# Patient Record
Sex: MALE | Race: WHITE | NOT HISPANIC OR LATINO | Employment: PART TIME | URBAN - METROPOLITAN AREA
[De-identification: names, ages, dates, MRNs, and addresses within clinical notes are randomized per-mention and may not be internally consistent; named-entity substitution may affect disease eponyms.]

---

## 2023-03-20 DIAGNOSIS — Z00.00 ENCOUNTER FOR GENERAL ADULT MEDICAL EXAMINATION WITHOUT ABNORMAL FINDINGS: ICD-10-CM

## 2023-03-20 DIAGNOSIS — D68.51 FACTOR V LEIDEN CARRIER (MULTI): Primary | ICD-10-CM

## 2023-03-20 RX ORDER — WARFARIN 2 MG/1
TABLET ORAL
Qty: 30 TABLET | Refills: 11 | Status: SHIPPED | OUTPATIENT
Start: 2023-03-20

## 2023-03-20 RX ORDER — WARFARIN SODIUM 5 MG/1
TABLET ORAL
Qty: 30 TABLET | Refills: 11 | Status: SHIPPED | OUTPATIENT
Start: 2023-03-20 | End: 2024-03-14

## 2023-03-20 RX ORDER — WARFARIN SODIUM 5 MG/1
TABLET ORAL
Qty: 30 TABLET | Refills: 11 | OUTPATIENT
Start: 2023-03-20

## 2023-03-20 RX ORDER — WARFARIN 2 MG/1
TABLET ORAL
COMMUNITY
Start: 2015-06-10 | End: 2023-03-20 | Stop reason: SDUPTHER

## 2023-03-20 RX ORDER — WARFARIN SODIUM 5 MG/1
TABLET ORAL
COMMUNITY
End: 2023-03-20 | Stop reason: SDUPTHER

## 2023-04-13 DIAGNOSIS — D68.51 FACTOR V LEIDEN CARRIER (MULTI): ICD-10-CM

## 2023-04-13 RX ORDER — WARFARIN 2 MG/1
TABLET ORAL
Qty: 30 TABLET | Refills: 11 | OUTPATIENT
Start: 2023-04-13

## 2023-04-13 RX ORDER — WARFARIN SODIUM 5 MG/1
TABLET ORAL
Qty: 30 TABLET | Refills: 11 | OUTPATIENT
Start: 2023-04-13

## 2023-07-12 ENCOUNTER — OFFICE VISIT (OUTPATIENT)
Dept: PRIMARY CARE | Facility: CLINIC | Age: 73
End: 2023-07-12
Payer: MEDICARE

## 2023-07-12 VITALS
WEIGHT: 163 LBS | BODY MASS INDEX: 24.14 KG/M2 | RESPIRATION RATE: 18 BRPM | HEIGHT: 69 IN | SYSTOLIC BLOOD PRESSURE: 128 MMHG | DIASTOLIC BLOOD PRESSURE: 75 MMHG | TEMPERATURE: 97.3 F | HEART RATE: 66 BPM | OXYGEN SATURATION: 97 %

## 2023-07-12 DIAGNOSIS — Z00.00 ROUTINE GENERAL MEDICAL EXAMINATION AT HEALTH CARE FACILITY: Primary | ICD-10-CM

## 2023-07-12 DIAGNOSIS — E78.5 HYPERLIPIDEMIA, UNSPECIFIED HYPERLIPIDEMIA TYPE: ICD-10-CM

## 2023-07-12 DIAGNOSIS — Z87.891 PERSONAL HISTORY OF TOBACCO USE, PRESENTING HAZARDS TO HEALTH: ICD-10-CM

## 2023-07-12 DIAGNOSIS — Z23 NEED FOR VACCINATION: ICD-10-CM

## 2023-07-12 DIAGNOSIS — Z12.11 SCREENING FOR COLORECTAL CANCER: ICD-10-CM

## 2023-07-12 DIAGNOSIS — I25.10 CAD IN NATIVE ARTERY: ICD-10-CM

## 2023-07-12 DIAGNOSIS — Z11.59 NEED FOR HEPATITIS C SCREENING TEST: ICD-10-CM

## 2023-07-12 DIAGNOSIS — Z12.12 SCREENING FOR COLORECTAL CANCER: ICD-10-CM

## 2023-07-12 PROBLEM — R91.8 OPACITY OF LUNG ON IMAGING STUDY: Status: RESOLVED | Noted: 2023-07-12 | Resolved: 2023-07-12

## 2023-07-12 PROBLEM — M25.511 RIGHT SHOULDER PAIN: Status: ACTIVE | Noted: 2023-07-12

## 2023-07-12 PROBLEM — S20.219A RIB CONTUSION: Status: RESOLVED | Noted: 2023-07-12 | Resolved: 2023-07-12

## 2023-07-12 PROBLEM — R22.2 MASS OF SKIN OF ABDOMEN: Status: RESOLVED | Noted: 2023-07-12 | Resolved: 2023-07-12

## 2023-07-12 PROBLEM — M79.673 FOOT PAIN: Status: RESOLVED | Noted: 2023-07-12 | Resolved: 2023-07-12

## 2023-07-12 PROBLEM — F17.210 SMOKES WITH GREATER THAN 40 PACK YEAR HISTORY: Status: ACTIVE | Noted: 2023-07-12

## 2023-07-12 PROBLEM — R06.2 WHEEZING: Status: ACTIVE | Noted: 2023-07-12

## 2023-07-12 PROBLEM — B35.1 ONYCHOMYCOSIS OF TOENAIL: Status: ACTIVE | Noted: 2023-07-12

## 2023-07-12 PROBLEM — D17.9 LIPOMA: Status: RESOLVED | Noted: 2023-07-12 | Resolved: 2023-07-12

## 2023-07-12 PROBLEM — M25.511 RIGHT SHOULDER PAIN: Status: RESOLVED | Noted: 2023-07-12 | Resolved: 2023-07-12

## 2023-07-12 PROBLEM — H66.90 OTITIS MEDIA: Status: RESOLVED | Noted: 2023-07-12 | Resolved: 2023-07-12

## 2023-07-12 PROBLEM — R10.9 ABDOMINAL PAIN: Status: RESOLVED | Noted: 2023-07-12 | Resolved: 2023-07-12

## 2023-07-12 PROBLEM — R05.9 COUGH: Status: RESOLVED | Noted: 2023-07-12 | Resolved: 2023-07-12

## 2023-07-12 PROBLEM — R06.2 WHEEZING: Status: RESOLVED | Noted: 2023-07-12 | Resolved: 2023-07-12

## 2023-07-12 PROBLEM — D22.9 MULTIPLE NEVI: Status: ACTIVE | Noted: 2023-07-12

## 2023-07-12 PROBLEM — M79.673 FOOT PAIN: Status: ACTIVE | Noted: 2023-07-12

## 2023-07-12 PROBLEM — R05.8 PRODUCTIVE COUGH: Status: RESOLVED | Noted: 2023-07-12 | Resolved: 2023-07-12

## 2023-07-12 PROBLEM — R91.8 LUNG NODULES: Status: ACTIVE | Noted: 2023-07-12

## 2023-07-12 PROBLEM — R91.8 OPACITY OF LUNG ON IMAGING STUDY: Status: ACTIVE | Noted: 2023-07-12

## 2023-07-12 PROBLEM — K04.7 TOOTH INFECTION: Status: RESOLVED | Noted: 2023-07-12 | Resolved: 2023-07-12

## 2023-07-12 PROBLEM — D17.21 LIPOMA OF RIGHT UPPER EXTREMITY: Status: ACTIVE | Noted: 2023-07-12

## 2023-07-12 PROBLEM — R10.9 ABDOMINAL PAIN: Status: ACTIVE | Noted: 2023-07-12

## 2023-07-12 PROBLEM — D17.9 LIPOMA: Status: ACTIVE | Noted: 2023-07-12

## 2023-07-12 PROBLEM — D22.9 MULTIPLE NEVI: Status: RESOLVED | Noted: 2023-07-12 | Resolved: 2023-07-12

## 2023-07-12 PROBLEM — R79.81 ABNORMAL PULSE OXIMETRY: Status: RESOLVED | Noted: 2023-07-12 | Resolved: 2023-07-12

## 2023-07-12 PROBLEM — B35.1 ONYCHOMYCOSIS OF TOENAIL: Status: RESOLVED | Noted: 2023-07-12 | Resolved: 2023-07-12

## 2023-07-12 PROBLEM — R79.89 ABNORMAL CBC: Status: RESOLVED | Noted: 2023-07-12 | Resolved: 2023-07-12

## 2023-07-12 PROBLEM — U07.1 DISEASE DUE TO SEVERE ACUTE RESPIRATORY SYNDROME CORONAVIRUS 2 (SARS-COV-2): Status: RESOLVED | Noted: 2023-07-12 | Resolved: 2023-07-12

## 2023-07-12 PROBLEM — D68.51 FACTOR V LEIDEN CARRIER (MULTI): Status: ACTIVE | Noted: 2023-07-12

## 2023-07-12 PROBLEM — S20.219A RIB CONTUSION: Status: ACTIVE | Noted: 2023-07-12

## 2023-07-12 PROBLEM — I70.90 ATHEROSCLEROSIS OF ARTERIES: Status: ACTIVE | Noted: 2023-07-12

## 2023-07-12 PROBLEM — R22.2 MASS OF SKIN OF ABDOMEN: Status: ACTIVE | Noted: 2023-07-12

## 2023-07-12 PROBLEM — R79.89 ABNORMAL CBC: Status: ACTIVE | Noted: 2023-07-12

## 2023-07-12 PROBLEM — J06.9 UPPER RESPIRATORY INFECTION: Status: RESOLVED | Noted: 2023-07-12 | Resolved: 2023-07-12

## 2023-07-12 PROBLEM — J43.9 EMPHYSEMA LUNG (MULTI): Status: ACTIVE | Noted: 2023-07-12

## 2023-07-12 PROBLEM — R79.81 ABNORMAL PULSE OXIMETRY: Status: ACTIVE | Noted: 2023-07-12

## 2023-07-12 PROBLEM — K04.7 TOOTH INFECTION: Status: ACTIVE | Noted: 2023-07-12

## 2023-07-12 PROBLEM — H61.21 EXCESSIVE CERUMEN IN RIGHT EAR CANAL: Status: RESOLVED | Noted: 2023-07-12 | Resolved: 2023-07-12

## 2023-07-12 PROCEDURE — 99397 PER PM REEVAL EST PAT 65+ YR: CPT | Performed by: FAMILY MEDICINE

## 2023-07-12 PROCEDURE — G0439 PPPS, SUBSEQ VISIT: HCPCS | Performed by: FAMILY MEDICINE

## 2023-07-12 PROCEDURE — 4004F PT TOBACCO SCREEN RCVD TLK: CPT | Performed by: FAMILY MEDICINE

## 2023-07-12 PROCEDURE — 90677 PCV20 VACCINE IM: CPT | Performed by: FAMILY MEDICINE

## 2023-07-12 PROCEDURE — G0009 ADMIN PNEUMOCOCCAL VACCINE: HCPCS | Performed by: FAMILY MEDICINE

## 2023-07-12 PROCEDURE — 1170F FXNL STATUS ASSESSED: CPT | Performed by: FAMILY MEDICINE

## 2023-07-12 PROCEDURE — 1159F MED LIST DOCD IN RCRD: CPT | Performed by: FAMILY MEDICINE

## 2023-07-12 RX ORDER — ATORVASTATIN CALCIUM 80 MG/1
80 TABLET, FILM COATED ORAL DAILY
COMMUNITY
End: 2023-10-26 | Stop reason: SDUPTHER

## 2023-07-12 ASSESSMENT — ENCOUNTER SYMPTOMS
NAUSEA: 0
ABDOMINAL DISTENTION: 0
FEVER: 0
HEMATURIA: 0
OCCASIONAL FEELINGS OF UNSTEADINESS: 0
DIZZINESS: 0
ABDOMINAL PAIN: 0
JOINT SWELLING: 0
SHORTNESS OF BREATH: 0
COUGH: 0
CHILLS: 0
SEIZURES: 0
DIFFICULTY URINATING: 0
BRUISES/BLEEDS EASILY: 0
AGITATION: 0
DEPRESSION: 0
POLYDIPSIA: 0
PALPITATIONS: 0
CONSTIPATION: 0
HALLUCINATIONS: 0
DIARRHEA: 0
SINUS PRESSURE: 0
MYALGIAS: 0
CONFUSION: 0
LOSS OF SENSATION IN FEET: 0
ADENOPATHY: 0
VOMITING: 0
EYE DISCHARGE: 0
WEAKNESS: 0
SORE THROAT: 0

## 2023-07-12 ASSESSMENT — ACTIVITIES OF DAILY LIVING (ADL)
DOING_HOUSEWORK: INDEPENDENT
GROCERY_SHOPPING: INDEPENDENT
BATHING: INDEPENDENT
MANAGING_FINANCES: INDEPENDENT
DRESSING: INDEPENDENT
TAKING_MEDICATION: INDEPENDENT

## 2023-07-12 ASSESSMENT — PATIENT HEALTH QUESTIONNAIRE - PHQ9
1. LITTLE INTEREST OR PLEASURE IN DOING THINGS: NOT AT ALL
SUM OF ALL RESPONSES TO PHQ9 QUESTIONS 1 AND 2: 0
2. FEELING DOWN, DEPRESSED OR HOPELESS: NOT AT ALL

## 2023-07-12 NOTE — PROGRESS NOTES
Subjective   Patient ID: Jimmy Aldana is a 72 y.o. male who presents for Medicare Annual Wellness Visit Subsequent, Hand Pain (left), and Mass (External, stomach).  Well Adult Physical   Patient here for a comprehensive physical exam.     Diet: Well balanced     Exercise: regularly    Social History    Tobacco Use      Smoking status: Every Day        Types: Cigarettes      Smokeless tobacco: Never    Alcohol use: Yes      Alcohol/week: 3.0 standard drinks of alcohol      Types: 3 Standard drinks or equivalent per week    Drug use: Never        Immunization History  Administered            Date(s) Administered    Influenza, High Dose Seasonal, Preservative Free                          2020      Moderna SARS-CoV-2 Vaccination                          2021      Pneumococcal Conjugate Pcv 20                          2023      Last colon cancer screenin to 10 years ago, history of polyps with unknown pathology    Family history of prostate cancer: No               Has tried Chantix,    Review of Systems   Constitutional:  Negative for chills and fever.   HENT:  Negative for ear pain, sinus pressure and sore throat.    Eyes:  Negative for discharge and visual disturbance.   Respiratory:  Negative for cough and shortness of breath.    Cardiovascular:  Negative for chest pain and palpitations.   Gastrointestinal:  Negative for abdominal distention, abdominal pain, constipation, diarrhea, nausea and vomiting.   Endocrine: Negative for cold intolerance, heat intolerance, polydipsia and polyuria.   Genitourinary:  Negative for difficulty urinating, hematuria and urgency.   Musculoskeletal:  Negative for joint swelling and myalgias.   Skin:  Negative for rash.   Allergic/Immunologic: Negative for environmental allergies and immunocompromised state.   Neurological:  Negative for dizziness, seizures and weakness.   Hematological:  Negative for adenopathy. Does not bruise/bleed easily.    Psychiatric/Behavioral:  Negative for agitation, confusion and hallucinations.        Objective   Physical Exam  Constitutional:       Appearance: Normal appearance.   Cardiovascular:      Rate and Rhythm: Normal rate and regular rhythm.   Pulmonary:      Effort: Pulmonary effort is normal.      Breath sounds: Normal breath sounds.   Abdominal:      General: Abdomen is flat. Bowel sounds are normal.      Palpations: Abdomen is soft.   Musculoskeletal:      Comments: Some changes consistent with osteoarthritis in hands bilaterally   Skin:     General: Skin is warm and dry.   Neurological:      Mental Status: He is alert.   Psychiatric:         Mood and Affect: Mood normal.         Behavior: Behavior normal.         Assessment/Plan   Problem List Items Addressed This Visit       CAD in native artery    Relevant Orders    Lipid Panel    Comprehensive Metabolic Panel    Hepatitis C antibody    Hyperlipidemia    Relevant Orders    Lipid Panel    Comprehensive Metabolic Panel    Hepatitis C antibody     Other Visit Diagnoses       Routine general medical examination at health care facility    -  Primary    Personal history of tobacco use, presenting hazards to health        Relevant Orders    CT lung screening low dose    Need for vaccination        Relevant Orders    Pneumococcal conjugate vaccine 20-valent IM (Completed)    Screening for colorectal cancer        Relevant Orders    Colonoscopy    Need for hepatitis C screening test        Relevant Orders    Hepatitis C antibody        Follow-up in 6 months for Medicare wellness visit

## 2023-07-24 ENCOUNTER — CLINICAL SUPPORT (OUTPATIENT)
Dept: PRIMARY CARE | Facility: CLINIC | Age: 73
End: 2023-07-24
Payer: MEDICARE

## 2023-07-24 DIAGNOSIS — E78.5 HYPERLIPIDEMIA, UNSPECIFIED HYPERLIPIDEMIA TYPE: ICD-10-CM

## 2023-07-24 DIAGNOSIS — I25.10 CAD IN NATIVE ARTERY: ICD-10-CM

## 2023-07-24 DIAGNOSIS — Z11.59 NEED FOR HEPATITIS C SCREENING TEST: ICD-10-CM

## 2023-07-24 PROCEDURE — 86803 HEPATITIS C AB TEST: CPT

## 2023-07-24 PROCEDURE — 80053 COMPREHEN METABOLIC PANEL: CPT

## 2023-07-24 PROCEDURE — 80061 LIPID PANEL: CPT

## 2023-07-25 LAB
ALANINE AMINOTRANSFERASE (SGPT) (U/L) IN SER/PLAS: 18 U/L (ref 10–52)
ALBUMIN (G/DL) IN SER/PLAS: 4.1 G/DL (ref 3.4–5)
ALKALINE PHOSPHATASE (U/L) IN SER/PLAS: 68 U/L (ref 33–136)
ANION GAP IN SER/PLAS: 11 MMOL/L (ref 10–20)
ASPARTATE AMINOTRANSFERASE (SGOT) (U/L) IN SER/PLAS: 25 U/L (ref 9–39)
BILIRUBIN TOTAL (MG/DL) IN SER/PLAS: 0.5 MG/DL (ref 0–1.2)
CALCIUM (MG/DL) IN SER/PLAS: 9.2 MG/DL (ref 8.6–10.6)
CARBON DIOXIDE, TOTAL (MMOL/L) IN SER/PLAS: 27 MMOL/L (ref 21–32)
CHLORIDE (MMOL/L) IN SER/PLAS: 109 MMOL/L (ref 98–107)
CHOLESTEROL (MG/DL) IN SER/PLAS: 101 MG/DL (ref 0–199)
CHOLESTEROL IN HDL (MG/DL) IN SER/PLAS: 45.6 MG/DL
CHOLESTEROL/HDL RATIO: 2.2
CREATININE (MG/DL) IN SER/PLAS: 1.01 MG/DL (ref 0.5–1.3)
GFR MALE: 79 ML/MIN/1.73M2
GLUCOSE (MG/DL) IN SER/PLAS: 84 MG/DL (ref 74–99)
HEPATITIS C VIRUS AB PRESENCE IN SERUM: NONREACTIVE
LDL: 43 MG/DL (ref 0–99)
POTASSIUM (MMOL/L) IN SER/PLAS: 4.6 MMOL/L (ref 3.5–5.3)
PROTEIN TOTAL: 7.2 G/DL (ref 6.4–8.2)
SODIUM (MMOL/L) IN SER/PLAS: 142 MMOL/L (ref 136–145)
TRIGLYCERIDE (MG/DL) IN SER/PLAS: 63 MG/DL (ref 0–149)
UREA NITROGEN (MG/DL) IN SER/PLAS: 20 MG/DL (ref 6–23)
VLDL: 13 MG/DL (ref 0–40)

## 2023-09-18 ENCOUNTER — TELEPHONE (OUTPATIENT)
Dept: PRIMARY CARE | Facility: CLINIC | Age: 73
End: 2023-09-18
Payer: MEDICARE

## 2023-09-23 RX ORDER — TIOTROPIUM BROMIDE 18 UG/1
18 CAPSULE ORAL; RESPIRATORY (INHALATION)
COMMUNITY
Start: 2014-10-27 | End: 2024-02-06 | Stop reason: WASHOUT

## 2023-09-23 RX ORDER — ALBUTEROL SULFATE 90 UG/1
2 AEROSOL, METERED RESPIRATORY (INHALATION) EVERY 4 HOURS PRN
COMMUNITY
Start: 2014-10-27 | End: 2024-02-06 | Stop reason: WASHOUT

## 2023-09-23 RX ORDER — FLUTICASONE PROPIONATE 50 MCG
2 SPRAY, SUSPENSION (ML) NASAL DAILY
COMMUNITY
Start: 2020-01-09 | End: 2024-02-06 | Stop reason: WASHOUT

## 2023-10-26 ENCOUNTER — OFFICE VISIT (OUTPATIENT)
Dept: CARDIOLOGY | Facility: CLINIC | Age: 73
End: 2023-10-26
Payer: MEDICARE

## 2023-10-26 VITALS
SYSTOLIC BLOOD PRESSURE: 122 MMHG | WEIGHT: 158.8 LBS | BODY MASS INDEX: 23.52 KG/M2 | DIASTOLIC BLOOD PRESSURE: 66 MMHG | OXYGEN SATURATION: 96 % | HEIGHT: 69 IN | HEART RATE: 86 BPM

## 2023-10-26 DIAGNOSIS — I25.10 CORONARY ARTERY CALCIFICATION SEEN ON CAT SCAN: ICD-10-CM

## 2023-10-26 DIAGNOSIS — Z79.01 LONG TERM CURRENT USE OF ANTICOAGULANT THERAPY: ICD-10-CM

## 2023-10-26 DIAGNOSIS — Z87.59 HISTORY OF MATERNAL DEEP VEIN THROMBOSIS (DVT): ICD-10-CM

## 2023-10-26 DIAGNOSIS — J43.9 PULMONARY EMPHYSEMA, UNSPECIFIED EMPHYSEMA TYPE (MULTI): ICD-10-CM

## 2023-10-26 DIAGNOSIS — I26.99 PULMONARY EMBOLISM, UNSPECIFIED CHRONICITY, UNSPECIFIED PULMONARY EMBOLISM TYPE, UNSPECIFIED WHETHER ACUTE COR PULMONALE PRESENT (MULTI): ICD-10-CM

## 2023-10-26 DIAGNOSIS — R01.1 MURMUR, HEART: ICD-10-CM

## 2023-10-26 DIAGNOSIS — D68.51 FACTOR V LEIDEN CARRIER (MULTI): ICD-10-CM

## 2023-10-26 DIAGNOSIS — Z86.718 HISTORY OF MATERNAL DEEP VEIN THROMBOSIS (DVT): ICD-10-CM

## 2023-10-26 DIAGNOSIS — E78.2 MIXED HYPERLIPIDEMIA: Primary | ICD-10-CM

## 2023-10-26 DIAGNOSIS — R09.89 BILATERAL CAROTID BRUITS: ICD-10-CM

## 2023-10-26 DIAGNOSIS — D68.59 HYPERCOAGULABLE STATE (MULTI): ICD-10-CM

## 2023-10-26 DIAGNOSIS — I25.10 CAD IN NATIVE ARTERY: ICD-10-CM

## 2023-10-26 DIAGNOSIS — Z86.711 HISTORY OF PULMONARY EMBOLUS (PE): ICD-10-CM

## 2023-10-26 DIAGNOSIS — J44.9 CHRONIC OBSTRUCTIVE PULMONARY DISEASE, UNSPECIFIED COPD TYPE (MULTI): ICD-10-CM

## 2023-10-26 DIAGNOSIS — F17.210 SMOKES WITH GREATER THAN 40 PACK YEAR HISTORY: ICD-10-CM

## 2023-10-26 DIAGNOSIS — R07.9 CHEST PAIN, UNSPECIFIED TYPE: ICD-10-CM

## 2023-10-26 PROCEDURE — 93000 ELECTROCARDIOGRAM COMPLETE: CPT | Performed by: INTERNAL MEDICINE

## 2023-10-26 PROCEDURE — 1159F MED LIST DOCD IN RCRD: CPT | Performed by: INTERNAL MEDICINE

## 2023-10-26 PROCEDURE — 99205 OFFICE O/P NEW HI 60 MIN: CPT | Performed by: INTERNAL MEDICINE

## 2023-10-26 PROCEDURE — 4004F PT TOBACCO SCREEN RCVD TLK: CPT | Performed by: INTERNAL MEDICINE

## 2023-10-26 PROCEDURE — 1126F AMNT PAIN NOTED NONE PRSNT: CPT | Performed by: INTERNAL MEDICINE

## 2023-10-26 RX ORDER — ATORVASTATIN CALCIUM 80 MG/1
80 TABLET, FILM COATED ORAL DAILY
Qty: 90 TABLET | Refills: 3 | Status: SHIPPED | OUTPATIENT
Start: 2023-10-26 | End: 2024-02-06 | Stop reason: WASHOUT

## 2023-10-26 ASSESSMENT — PAIN SCALES - GENERAL: PAINLEVEL: 0-NO PAIN

## 2023-10-26 NOTE — PROGRESS NOTES
CARDIOLOGY CONSULTATION NOTE       Patient:    Jimmy Aldana    YOB: 1950  MRN:    [id number]    Date:   10/26/2023     Reason for Visit: To establish ongoing cardiovascular care     .  HPI:     Jimmy Aldana was seen in cardiac evaluation at the  Cardiology office October 26, 2023.      The patients problems are listed as in the impression below.    Electronic medical records reviewed.    Patient is a pleasant 73-year-old hyperlipidemic gentleman with known coronary artery disease by note of heavily calcified coronary arteries noted on chest CT.  Patient has been seen by Dr. Holley and cardiology evaluation previously now presents for establishment of cardiovascular ongoing care in the absence of Dr. Taylor post snf.  This is my first visit with the patient.    Patient states that overall he feels well.  He has chest pain which at times is sharp but not with exertion.  He has no shortness of breath.  He walks 8-10,000 steps per day.  He has no limitations.    Patient denies SOB, Lightheadedness, Dizziness, TIA or CVA symptoms.  No CHF or Edema.  No Palpitations.  No GI,  or Bleeding Issues. No Recent Fever or Chills.     Cardiovascular and general review of systems is otherwise negative.    A 14-system review is otherwise negative, other than noted.      IMPRESSION:      Chest pain symptoms  Coronary artery disease with severe coronary calcifications by CT, 9/2023  Hyperlipidemia   Carotid bruits  Systolic ejection murmur  Prior submaximal stress echocardiogram 10/2022.  COPD / Emphysema  Pulmonary embolism, history recurrent  Deep venous thrombosis, history  Factor V Leiden deficiency  Long-term chronic anticoagulation, warfarin, life   COVID infection 7/2022  Tobacco abuse, ongoing  Family history of coronary artery disease  Otherwise as per assessment below.    RECOMMENDATIONS:      Patient has above-noted symptoms.  They are somewhat atypical.  He does have a carotid bruits as  well and systolic ejection murmur is noted.  His last echo 10/2022 was above maximal and nondiagnostic.  He does have a positive calcium moderate severe by conventional CT and given his symptoms and above findings would suggest follow-up evaluation to rule out progressive cardiovascular disease: Formal CT calcium scoring, Lexiscan Myoview perfusion stress test, echocardiogram, carotid ultrasound at this time.  Further recommendations to follow-up including possible invasive testing i.e. cardiac catheterization as warranted.    Exercise dietary program was encouraged.  Hydration.    He will continue his current medications.  Refills were provided.    nubelo use was encouraged.    We will plan to see back following the above testing with repeat laboratory Studies and ECG as noted below.     Patient will follow up with their primary physician for general care.    The patient knows to contact medical care earlier if need be.    ALLERGIES:     Allergies   Allergen Reactions    Penicillins Unknown and Hives     20 yrs ago had reaction        MEDICATIONS:     Current Outpatient Medications   Medication Instructions    albuterol 90 mcg/actuation inhaler 2 puffs, inhalation, Every 4 hours PRN    atorvastatin (LIPITOR) 80 mg, oral, Daily    fluticasone (Flonase) 50 mcg/actuation nasal spray 2 sprays, Each Nostril, Daily    tiotropium (SPIRIVA) 18 mcg, inhalation    warfarin (Coumadin) 2 mg tablet TAKE 1 TABLET BY MOUTH EVERY DAY WITH A 5 MG TABLET or as directed per INR readings    warfarin (Coumadin) 5 mg tablet TAKE 1 TABLET BY MOUTH EVERY DAY WITH A 2 MG TABLET or as directed per INR readings       PAST MEDICAL HISTORY:   Hyperlipidemia.  Denies diabetes or hypertension.  No history of carotid artery disease.  History of factor V Leiden deficiency.  Prior DVT and pulmonary embolisms.  On lifetime Coumadin.  COVID 7/2022.  COPD/emphysema, mild.  Positive CT with vascular calcifications moderate severe.  No other  significant past medical or surgical history.    SOCIAL HISTORY:   .  Denies alcohol or illicit drug use.  Ongoing tobacco abuse 8 cigarettes/day.    FAMILY HISTORY:   Positive family history of CAD.    ELECTROCARDIOGRAM:      Patient declined.    CARDIAC TESTING:      None    LABORATORY DATA:      Recent as noted below.      All above testing was personally reviewed.    VITALS:     Vitals:    10/26/23 1328   BP: 122/66   Pulse: 86   SpO2: 96%       Wt Readings from Last 4 Encounters:   10/26/23 72 kg (158 lb 12.8 oz)   07/12/23 73.9 kg (163 lb)   12/14/22 77.7 kg (171 lb 4 oz)   11/28/22 78 kg (172 lb)       PHYSICAL EXAMINATION:      General: No acute distress. Vital signs as noted. Alert and oriented.  Head And Neck Examination: No jugular venous distention, no carotid bruits, no mass. Carotid upstrokes preserved. Oral mucosa moist.  No xanthelasma. Head and neck examination otherwise unremarkable.  Lungs: Clear to auscultation and percussion. No wheezes, no rales,  and no rhonchi.  Chest: Excursion appeared to be normal. No chest wall tenderness on palpation.  Heart: Normal S1 and S2. No S3. No S4. No rub. Grade 1/6 systolic murmur, best heard at the left sternal border. Point of maximal impulse was within normal limits.  Abdomen: Soft. Nontender. No organomegaly. No bruits. No masses. Obese.  Extremities: No bipedal edema. No clubbing. No cyanosis.  Pulses are strong throughout. No bruits.  Musculoskeletal Exam: No ulcers, otherwise unremarkable.  Neuro: Neurologically appeared grossly intact.    LAB:     CBC:   Lab Results   Component Value Date    WBC 11.0 12/14/2022    RBC 3.79 (L) 12/14/2022    HGB 11.5 (L) 12/14/2022    HCT 35.6 (L) 12/14/2022     12/14/2022        CMP:    Lab Results   Component Value Date     07/24/2023    K 4.6 07/24/2023     (H) 07/24/2023    CO2 27 07/24/2023    BUN 20 07/24/2023    CREATININE 1.01 07/24/2023    GLUCOSE 84 07/24/2023    CALCIUM 9.2 07/24/2023        Lipid Profile:    Lab Results   Component Value Date    TRIG 63 07/24/2023    HDL 45.6 07/24/2023     Hepatic Function Panel:    Lab Results   Component Value Date    ALKPHOS 68 07/24/2023    ALT 18 07/24/2023    AST 25 07/24/2023    PROT 7.2 07/24/2023    BILITOT 0.5 07/24/2023    BILIDIR 0.1 10/24/2022     PT/INR:    Lab Results   Component Value Date    PROTIME 50.5 (H) 12/14/2022    INR 4.3 (H) 12/14/2022       RADIOLOGY:     Nuclear Stress Test    (Results Pending)   Vascular US Carotid Artery Duplex Bilateral    (Results Pending)   CT cardiac scoring wo IV contrast    (Results Pending)   Transthoracic echo (TTE) complete    (Results Pending)       PROBLEM LIST:     Patient Active Problem List   Diagnosis    Atherosclerosis of arteries    CAD in native artery    Coronary artery calcification seen on CAT scan    Chronic obstructive pulmonary disease (CMS/HCC)    Factor V Leiden carrier (CMS/HCC)    Hyperlipidemia    Lipoma of right upper extremity    Lung nodules    Smokes with greater than 40 pack year history    Pulmonary emboli (CMS/HCC)    Hypercoagulable state (CMS/HCC)    Long term current use of anticoagulant therapy    History of pulmonary embolus (PE)    History of maternal deep vein thrombosis (DVT)    Chest pain    Murmur, heart             Adolph Ortega MD, [facility] /  Cardiology      Of Note:  [employer] voice recognition dictation software was utilized partially in the preparation of this note, therefore, inaccuracies in spelling, word choice and punctuation may have occurred which were not recognized the time of signing.    ----

## 2023-10-26 NOTE — PATIENT INSTRUCTIONS
Exercise diet weight loss program.    Hydrate    Use My Chart portal for reviewing records, testing and contacting office.

## 2024-01-04 ENCOUNTER — OFFICE VISIT (OUTPATIENT)
Dept: PRIMARY CARE | Facility: CLINIC | Age: 74
End: 2024-01-04
Payer: MEDICARE

## 2024-01-04 VITALS
OXYGEN SATURATION: 98 % | WEIGHT: 161 LBS | RESPIRATION RATE: 16 BRPM | TEMPERATURE: 97.6 F | SYSTOLIC BLOOD PRESSURE: 160 MMHG | BODY MASS INDEX: 23.78 KG/M2 | HEART RATE: 71 BPM | DIASTOLIC BLOOD PRESSURE: 64 MMHG

## 2024-01-04 DIAGNOSIS — L30.9 DERMATITIS: Primary | ICD-10-CM

## 2024-01-04 PROCEDURE — 1159F MED LIST DOCD IN RCRD: CPT | Performed by: FAMILY MEDICINE

## 2024-01-04 PROCEDURE — 99214 OFFICE O/P EST MOD 30 MIN: CPT | Performed by: FAMILY MEDICINE

## 2024-01-04 PROCEDURE — 1126F AMNT PAIN NOTED NONE PRSNT: CPT | Performed by: FAMILY MEDICINE

## 2024-01-04 RX ORDER — PREDNISONE 10 MG/1
TABLET ORAL
Qty: 45 TABLET | Refills: 0 | Status: SHIPPED | OUTPATIENT
Start: 2024-01-04 | End: 2024-01-18

## 2024-01-04 NOTE — PROGRESS NOTES
Subjective   Patient ID: Jimmy Aldana is a 73 y.o. male who presents for Rash (Both arms and legs, 10 days).  HPI    For about the last 10 days patient has had an erythematous itchy rash since has spread to his bilateral shins.  No fevers or chills no shortness of breath.    Review of Systems    Objective   Physical Exam  Constitutional:       Appearance: Normal appearance.   Cardiovascular:      Rate and Rhythm: Normal rate and regular rhythm.   Pulmonary:      Effort: Pulmonary effort is normal.      Breath sounds: Normal breath sounds.   Skin:     Comments: Thickened erythematous rash over bilateral posterior forearms.  Ichthyosis.  Some newer patches on bilateral lower extremities with some excoriation just below knees   Neurological:      Mental Status: He is alert.   Psychiatric:         Mood and Affect: Mood normal.         Behavior: Behavior normal.         Assessment/Plan   Problem List Items Addressed This Visit       Dermatitis - Primary    Relevant Medications    predniSONE (Deltasone) 10 mg tablet     Follow-up in 2 weeks if not improving

## 2024-01-15 ENCOUNTER — APPOINTMENT (OUTPATIENT)
Dept: PRIMARY CARE | Facility: CLINIC | Age: 74
End: 2024-01-15
Payer: MEDICARE

## 2024-01-30 ENCOUNTER — TELEPHONE (OUTPATIENT)
Dept: PRIMARY CARE | Facility: CLINIC | Age: 74
End: 2024-01-30
Payer: MEDICARE

## 2024-01-30 NOTE — TELEPHONE ENCOUNTER
Jimmy Aldana phoned, [phone].  He still has his rash after 7 days of using the cortisone cream. You instructed him to phone back if it did not go away.  He is due to come in on February 6th for his MWV. So, he does not want to see Terry in the meantime and will talk with you about his rash on the 6th.

## 2024-02-06 ENCOUNTER — OFFICE VISIT (OUTPATIENT)
Dept: PRIMARY CARE | Facility: CLINIC | Age: 74
End: 2024-02-06
Payer: MEDICARE

## 2024-02-06 VITALS
WEIGHT: 163 LBS | RESPIRATION RATE: 16 BRPM | DIASTOLIC BLOOD PRESSURE: 71 MMHG | TEMPERATURE: 97.8 F | HEIGHT: 69 IN | BODY MASS INDEX: 24.14 KG/M2 | HEART RATE: 66 BPM | SYSTOLIC BLOOD PRESSURE: 138 MMHG | OXYGEN SATURATION: 98 %

## 2024-02-06 DIAGNOSIS — I25.10 CAD IN NATIVE ARTERY: ICD-10-CM

## 2024-02-06 DIAGNOSIS — D68.51 FACTOR V LEIDEN CARRIER (MULTI): ICD-10-CM

## 2024-02-06 DIAGNOSIS — Z00.00 ROUTINE GENERAL MEDICAL EXAMINATION AT HEALTH CARE FACILITY: Primary | ICD-10-CM

## 2024-02-06 DIAGNOSIS — Z23 NEED FOR VACCINATION: ICD-10-CM

## 2024-02-06 DIAGNOSIS — L30.9 DERMATITIS: ICD-10-CM

## 2024-02-06 PROBLEM — D17.21 LIPOMA OF RIGHT UPPER EXTREMITY: Status: RESOLVED | Noted: 2023-07-12 | Resolved: 2024-02-06

## 2024-02-06 PROBLEM — Z87.59 HISTORY OF MATERNAL DEEP VEIN THROMBOSIS (DVT): Status: RESOLVED | Noted: 2023-10-26 | Resolved: 2024-02-06

## 2024-02-06 PROBLEM — R07.9 CHEST PAIN: Status: RESOLVED | Noted: 2023-10-26 | Resolved: 2024-02-06

## 2024-02-06 PROBLEM — Z86.718 HISTORY OF MATERNAL DEEP VEIN THROMBOSIS (DVT): Status: RESOLVED | Noted: 2023-10-26 | Resolved: 2024-02-06

## 2024-02-06 PROBLEM — J44.9 CHRONIC OBSTRUCTIVE PULMONARY DISEASE (MULTI): Status: RESOLVED | Noted: 2023-07-12 | Resolved: 2024-02-06

## 2024-02-06 PROCEDURE — 99397 PER PM REEVAL EST PAT 65+ YR: CPT | Performed by: FAMILY MEDICINE

## 2024-02-06 PROCEDURE — 90662 IIV NO PRSV INCREASED AG IM: CPT | Performed by: FAMILY MEDICINE

## 2024-02-06 PROCEDURE — G0008 ADMIN INFLUENZA VIRUS VAC: HCPCS | Performed by: FAMILY MEDICINE

## 2024-02-06 PROCEDURE — 1160F RVW MEDS BY RX/DR IN RCRD: CPT | Performed by: FAMILY MEDICINE

## 2024-02-06 PROCEDURE — 1170F FXNL STATUS ASSESSED: CPT | Performed by: FAMILY MEDICINE

## 2024-02-06 PROCEDURE — 1126F AMNT PAIN NOTED NONE PRSNT: CPT | Performed by: FAMILY MEDICINE

## 2024-02-06 PROCEDURE — 1159F MED LIST DOCD IN RCRD: CPT | Performed by: FAMILY MEDICINE

## 2024-02-06 ASSESSMENT — ENCOUNTER SYMPTOMS
ADENOPATHY: 0
SINUS PRESSURE: 0
FEVER: 0
HALLUCINATIONS: 0
DIFFICULTY URINATING: 0
JOINT SWELLING: 0
AGITATION: 0
HEMATURIA: 0
VOMITING: 0
SEIZURES: 0
DIZZINESS: 0
POLYDIPSIA: 0
ABDOMINAL DISTENTION: 0
SORE THROAT: 0
CONFUSION: 0
ABDOMINAL PAIN: 0
MYALGIAS: 0
DIARRHEA: 0
NAUSEA: 0
SHORTNESS OF BREATH: 0
WEAKNESS: 0
CHILLS: 0
COUGH: 0
BRUISES/BLEEDS EASILY: 0
EYE DISCHARGE: 0
CONSTIPATION: 0
PALPITATIONS: 0

## 2024-02-06 ASSESSMENT — ACTIVITIES OF DAILY LIVING (ADL)
DRESSING: INDEPENDENT
MANAGING_FINANCES: INDEPENDENT
DOING_HOUSEWORK: INDEPENDENT
TAKING_MEDICATION: INDEPENDENT
BATHING: INDEPENDENT
GROCERY_SHOPPING: INDEPENDENT

## 2024-02-06 ASSESSMENT — PATIENT HEALTH QUESTIONNAIRE - PHQ9
1. LITTLE INTEREST OR PLEASURE IN DOING THINGS: NOT AT ALL
2. FEELING DOWN, DEPRESSED OR HOPELESS: NOT AT ALL
SUM OF ALL RESPONSES TO PHQ9 QUESTIONS 1 AND 2: 0

## 2024-02-06 NOTE — PROGRESS NOTES
Subjective   Reason for Visit: Jimmy Aldana is an 73 y.o. male here for a Medicare Wellness visit.          Reviewed all medications by prescribing practitioner or clinical pharmacist (such as prescriptions, OTCs, herbal therapies and supplements) and documented in the medical record.    Well Adult Physical   Patient here for a comprehensive physical exam.The patient reports problems -rash that he was seen for at his last appointment had significantly improved with prednisone however since completing the course it has recurred overall it is very itchy and uncomfortable worse on his bilateral upper extremities but now involved on lower extremities as well    Diet: Well-balanced    Exercise: Very active    Social History    Tobacco Use      Smoking status: Every Day        Types: Cigarettes      Smokeless tobacco: Never    Alcohol use: Yes      Alcohol/week: 3.0 standard drinks of alcohol      Types: 3 Standard drinks or equivalent per week    Drug use: Never        Immunization History  Administered            Date(s) Administered    Influenza, High Dose Seasonal, Preservative Free                          11/09/2020      Influenza, Seasonal, Quadrivalent, Adjuvanted                          01/03/2023      Influenza, Unspecified                          11/09/2020 01/03/2023      Moderna SARS-CoV-2 Vaccination                          02/09/2021  03/09/2021  10/25/2021      Pneumococcal conjugate vaccine, 20-valent (PREVNAR 20)                          07/12/2023      Tdap vaccine, age 7 year and older (BOOSTRIX, ADACEL)                          05/05/2014      Zoster, live          05/05/2014      Last colon cancer screening: Due for and will schedule once his stress test is complete           Rash  Pertinent negatives include no cough, diarrhea, fever, shortness of breath, sore throat or vomiting.       Patient Care Team:  Cleopatra Siddiqui MD as PCP - General (Family Medicine)  Cleopatra Siddiqui MD as PCP - [place]  "Medicare Advantage PCP  Alhaji Dodd MD as Consulting Physician (Interventional Cardiology)     Review of Systems   Constitutional:  Negative for chills and fever.   HENT:  Negative for ear pain, sinus pressure and sore throat.    Eyes:  Negative for discharge and visual disturbance.   Respiratory:  Negative for cough and shortness of breath.    Cardiovascular:  Negative for chest pain and palpitations.   Gastrointestinal:  Negative for abdominal distention, abdominal pain, constipation, diarrhea, nausea and vomiting.   Endocrine: Negative for cold intolerance, heat intolerance, polydipsia and polyuria.   Genitourinary:  Negative for difficulty urinating, hematuria and urgency.   Musculoskeletal:  Negative for joint swelling and myalgias.   Skin:  Positive for rash.   Allergic/Immunologic: Negative for environmental allergies and immunocompromised state.   Neurological:  Negative for dizziness, seizures and weakness.   Hematological:  Negative for adenopathy. Does not bruise/bleed easily.   Psychiatric/Behavioral:  Negative for agitation, confusion and hallucinations.        Objective   Vitals:  /71   Pulse 66   Temp 36.6 °C (97.8 °F)   Resp 16   Ht 1.753 m (5' 9\")   Wt 73.9 kg (163 lb)   SpO2 98%   BMI 24.07 kg/m²       Physical Exam  Constitutional:       Appearance: Normal appearance.   Cardiovascular:      Rate and Rhythm: Normal rate and regular rhythm.   Pulmonary:      Effort: Pulmonary effort is normal.      Breath sounds: Normal breath sounds.   Abdominal:      General: Abdomen is flat. Bowel sounds are normal.      Palpations: Abdomen is soft.   Skin:     General: Skin is warm and dry.      Comments: Thickened erythematous rash is more macular.  Some fissuring appreciated overall very dry.  Worse over bilateral upper extremities with some involvement in lower extremities significant excoriation diffusely   Neurological:      Mental Status: He is alert.   Psychiatric:         Mood and " Affect: Mood normal.         Behavior: Behavior normal.         Assessment/Plan   Problem List Items Addressed This Visit       CAD in native artery    Factor V Leiden carrier (CMS/HCC)    Dermatitis    Current Assessment & Plan     Improved with prednisone, but quickly recurred, referred to derm          Other Visit Diagnoses       Routine general medical examination at health care facility    -  Primary        Follow-up in 6 months

## 2024-02-06 NOTE — PROGRESS NOTES
Subjective   Patient ID: Jimmy Aldana is a 73 y.o. male who presents for Medicare Annual Wellness Visit Subsequent and Rash.  HPI    Review of Systems    Objective   Physical Exam    Assessment/Plan   Problem List Items Addressed This Visit    None

## 2024-02-13 ENCOUNTER — LAB (OUTPATIENT)
Dept: LAB | Facility: LAB | Age: 74
End: 2024-02-13
Payer: MEDICARE

## 2024-02-13 DIAGNOSIS — Z86.718 HISTORY OF MATERNAL DEEP VEIN THROMBOSIS (DVT): ICD-10-CM

## 2024-02-13 DIAGNOSIS — I26.99 PULMONARY EMBOLISM, UNSPECIFIED CHRONICITY, UNSPECIFIED PULMONARY EMBOLISM TYPE, UNSPECIFIED WHETHER ACUTE COR PULMONALE PRESENT (MULTI): ICD-10-CM

## 2024-02-13 DIAGNOSIS — J43.9 PULMONARY EMPHYSEMA, UNSPECIFIED EMPHYSEMA TYPE (MULTI): ICD-10-CM

## 2024-02-13 DIAGNOSIS — J44.9 CHRONIC OBSTRUCTIVE PULMONARY DISEASE, UNSPECIFIED COPD TYPE (MULTI): ICD-10-CM

## 2024-02-13 DIAGNOSIS — I25.10 CORONARY ARTERY CALCIFICATION SEEN ON CAT SCAN: ICD-10-CM

## 2024-02-13 DIAGNOSIS — R01.1 MURMUR, HEART: ICD-10-CM

## 2024-02-13 DIAGNOSIS — Z87.59 HISTORY OF MATERNAL DEEP VEIN THROMBOSIS (DVT): ICD-10-CM

## 2024-02-13 DIAGNOSIS — Z79.01 LONG TERM CURRENT USE OF ANTICOAGULANT THERAPY: ICD-10-CM

## 2024-02-13 DIAGNOSIS — E78.2 MIXED HYPERLIPIDEMIA: ICD-10-CM

## 2024-02-13 DIAGNOSIS — D68.59 HYPERCOAGULABLE STATE (MULTI): ICD-10-CM

## 2024-02-13 DIAGNOSIS — D68.51 FACTOR V LEIDEN CARRIER (MULTI): ICD-10-CM

## 2024-02-13 DIAGNOSIS — F17.210 SMOKES WITH GREATER THAN 40 PACK YEAR HISTORY: ICD-10-CM

## 2024-02-13 DIAGNOSIS — R07.9 CHEST PAIN, UNSPECIFIED TYPE: ICD-10-CM

## 2024-02-13 DIAGNOSIS — Z86.711 HISTORY OF PULMONARY EMBOLUS (PE): ICD-10-CM

## 2024-02-13 DIAGNOSIS — I25.10 CAD IN NATIVE ARTERY: ICD-10-CM

## 2024-02-13 DIAGNOSIS — R09.89 BILATERAL CAROTID BRUITS: ICD-10-CM

## 2024-02-13 LAB
ALBUMIN SERPL BCP-MCNC: 4.1 G/DL (ref 3.4–5)
ALP SERPL-CCNC: 67 U/L (ref 33–136)
ALT SERPL W P-5'-P-CCNC: 13 U/L (ref 10–52)
ANION GAP SERPL CALC-SCNC: 10 MMOL/L (ref 10–20)
AST SERPL W P-5'-P-CCNC: 18 U/L (ref 9–39)
BILIRUB DIRECT SERPL-MCNC: 0.1 MG/DL (ref 0–0.3)
BILIRUB SERPL-MCNC: 0.5 MG/DL (ref 0–1.2)
BUN SERPL-MCNC: 23 MG/DL (ref 6–23)
CALCIUM SERPL-MCNC: 9.2 MG/DL (ref 8.6–10.3)
CHLORIDE SERPL-SCNC: 107 MMOL/L (ref 98–107)
CO2 SERPL-SCNC: 29 MMOL/L (ref 21–32)
CREAT SERPL-MCNC: 1.06 MG/DL (ref 0.5–1.3)
EGFRCR SERPLBLD CKD-EPI 2021: 75 ML/MIN/1.73M*2
ERYTHROCYTE [DISTWIDTH] IN BLOOD BY AUTOMATED COUNT: 13.7 % (ref 11.5–14.5)
GLUCOSE SERPL-MCNC: 86 MG/DL (ref 74–99)
HCT VFR BLD AUTO: 42.8 % (ref 41–52)
HGB BLD-MCNC: 13.8 G/DL (ref 13.5–17.5)
MCH RBC QN AUTO: 31 PG (ref 26–34)
MCHC RBC AUTO-ENTMCNC: 32.2 G/DL (ref 32–36)
MCV RBC AUTO: 96 FL (ref 80–100)
NRBC BLD-RTO: 0 /100 WBCS (ref 0–0)
PLATELET # BLD AUTO: 297 X10*3/UL (ref 150–450)
POTASSIUM SERPL-SCNC: 4.8 MMOL/L (ref 3.5–5.3)
PROT SERPL-MCNC: 7 G/DL (ref 6.4–8.2)
RBC # BLD AUTO: 4.45 X10*6/UL (ref 4.5–5.9)
SODIUM SERPL-SCNC: 141 MMOL/L (ref 136–145)
TSH SERPL-ACNC: 1.68 MIU/L (ref 0.44–3.98)
WBC # BLD AUTO: 8.4 X10*3/UL (ref 4.4–11.3)

## 2024-02-13 PROCEDURE — 80053 COMPREHEN METABOLIC PANEL: CPT

## 2024-02-13 PROCEDURE — 84443 ASSAY THYROID STIM HORMONE: CPT

## 2024-02-13 PROCEDURE — 82248 BILIRUBIN DIRECT: CPT

## 2024-02-13 PROCEDURE — 85027 COMPLETE CBC AUTOMATED: CPT

## 2024-02-29 ENCOUNTER — OFFICE VISIT (OUTPATIENT)
Dept: CARDIOLOGY | Facility: CLINIC | Age: 74
End: 2024-02-29
Payer: MEDICARE

## 2024-02-29 VITALS
DIASTOLIC BLOOD PRESSURE: 80 MMHG | HEIGHT: 69 IN | SYSTOLIC BLOOD PRESSURE: 140 MMHG | BODY MASS INDEX: 24.59 KG/M2 | OXYGEN SATURATION: 100 % | WEIGHT: 166 LBS | HEART RATE: 73 BPM

## 2024-02-29 DIAGNOSIS — J44.9 CHRONIC OBSTRUCTIVE PULMONARY DISEASE, UNSPECIFIED COPD TYPE (MULTI): ICD-10-CM

## 2024-02-29 DIAGNOSIS — Z87.59 HISTORY OF MATERNAL DEEP VEIN THROMBOSIS (DVT): ICD-10-CM

## 2024-02-29 DIAGNOSIS — E78.2 MIXED HYPERLIPIDEMIA: ICD-10-CM

## 2024-02-29 DIAGNOSIS — I70.90 ATHEROSCLEROSIS OF ARTERIES: ICD-10-CM

## 2024-02-29 DIAGNOSIS — D68.51 FACTOR V LEIDEN CARRIER (MULTI): ICD-10-CM

## 2024-02-29 DIAGNOSIS — I25.10 CAD IN NATIVE ARTERY: ICD-10-CM

## 2024-02-29 DIAGNOSIS — J43.9 PULMONARY EMPHYSEMA, UNSPECIFIED EMPHYSEMA TYPE (MULTI): ICD-10-CM

## 2024-02-29 DIAGNOSIS — Z86.711 HISTORY OF PULMONARY EMBOLUS (PE): ICD-10-CM

## 2024-02-29 DIAGNOSIS — R09.89 BILATERAL CAROTID BRUITS: ICD-10-CM

## 2024-02-29 DIAGNOSIS — R01.1 MURMUR, HEART: Primary | ICD-10-CM

## 2024-02-29 DIAGNOSIS — Z79.01 LONG TERM CURRENT USE OF ANTICOAGULANT THERAPY: ICD-10-CM

## 2024-02-29 DIAGNOSIS — I25.10 CORONARY ARTERY CALCIFICATION SEEN ON CAT SCAN: ICD-10-CM

## 2024-02-29 DIAGNOSIS — Z86.718 HISTORY OF MATERNAL DEEP VEIN THROMBOSIS (DVT): ICD-10-CM

## 2024-02-29 DIAGNOSIS — D68.59 HYPERCOAGULABLE STATE (MULTI): ICD-10-CM

## 2024-02-29 DIAGNOSIS — R07.9 CHEST PAIN, UNSPECIFIED TYPE: ICD-10-CM

## 2024-02-29 DIAGNOSIS — I26.99 PULMONARY EMBOLISM, UNSPECIFIED CHRONICITY, UNSPECIFIED PULMONARY EMBOLISM TYPE, UNSPECIFIED WHETHER ACUTE COR PULMONALE PRESENT (MULTI): ICD-10-CM

## 2024-02-29 DIAGNOSIS — F17.210 SMOKES WITH GREATER THAN 40 PACK YEAR HISTORY: ICD-10-CM

## 2024-02-29 PROCEDURE — 1160F RVW MEDS BY RX/DR IN RCRD: CPT | Performed by: INTERNAL MEDICINE

## 2024-02-29 PROCEDURE — 1126F AMNT PAIN NOTED NONE PRSNT: CPT | Performed by: INTERNAL MEDICINE

## 2024-02-29 PROCEDURE — 1159F MED LIST DOCD IN RCRD: CPT | Performed by: INTERNAL MEDICINE

## 2024-02-29 PROCEDURE — 99214 OFFICE O/P EST MOD 30 MIN: CPT | Performed by: INTERNAL MEDICINE

## 2024-02-29 RX ORDER — ATORVASTATIN CALCIUM 80 MG/1
80 TABLET, FILM COATED ORAL DAILY
Qty: 30 TABLET | Refills: 11 | Status: SHIPPED | OUTPATIENT
Start: 2024-02-29 | End: 2024-04-25 | Stop reason: SDUPTHER

## 2024-02-29 RX ORDER — KETOROLAC TROMETHAMINE 5 MG/ML
1 SOLUTION OPHTHALMIC 4 TIMES DAILY
COMMUNITY
Start: 2024-02-07 | End: 2024-04-25 | Stop reason: ALTCHOICE

## 2024-02-29 ASSESSMENT — PAIN SCALES - GENERAL: PAINLEVEL: 0-NO PAIN

## 2024-02-29 NOTE — PROGRESS NOTES
CARDIOLOGY OFFICE NOTE     Date:   2/29/2024    Patient:    Jimmy Aldana    YOB: 1950    Primary Physician: Cleopatra Siddiqui MD       Reason for Visit: 4-month follow-up visit.      HPI:     Jimmy Aldana was seen in cardiac evaluation at the  Cardiology office February 29, 2024.      The patients problems are listed as in the impression below.    Electronic medical records reviewed.    Patient returns.  He was seen October 2023.  At that time he had been scheduled for cardiac testing but did not follow through with that.  He did get laboratory studies done recently without a lipid profile.  He has not been taking his cholesterol medicines of late.    He did have a CT scan of the chest by his primary which noted no significant changes compared to the previous study from a pulmonary embolism standpoint.    He still has complaints of chest pain with and without exertion.  He has had no significant lightheadedness or dizziness.  He does note palpitations.    Patient denies Lightheadedness, Dizziness, TIA or CVA symptoms.  No CHF or Edema.  No GI,  or Bleeding Issues. No Recent Fever or Chills.     Cardiovascular and general review of systems is otherwise negative.    A 14-system review is otherwise negative, other than noted.     PHYSICAL EXAMINATION:      Vitals:    02/29/24 1454   BP: 140/80   Pulse: 73   SpO2: 100%     Repeat 132/76 BP.    General: No acute distress. Vital signs as noted. Alert and oriented.  Head And Neck Examination: No jugular venous distention, no carotid bruits, no mass. Carotid upstrokes preserved. Oral mucosa moist.  No xanthelasma. Head and neck examination otherwise unremarkable.  Lungs: Clear to auscultation and percussion. No wheezes, no rales,  and no rhonchi.  Chest: Excursion appeared to be normal. No chest wall tenderness on palpation.  Heart: Normal S1 and S2. No S3. No S4. No rub. Grade 1/6 systolic murmur, best heard at the left sternal border. Point of  maximal impulse was within normal limits.  Abdomen: Soft. Nontender. No organomegaly. No bruits. No masses. Obese.  Extremities: No bipedal edema. No clubbing. No cyanosis.  Pulses are strong throughout. No bruits.  Musculoskeletal Exam: No ulcers, otherwise unremarkable.  Neuro: Neurologically appeared grossly intact.  .  IMPRESSION:      Chest pain symptoms  Coronary artery disease with severe coronary calcifications by CT, 9/2023  Hyperlipidemia   Carotid bruits  Systolic ejection murmur  Prior submaximal stress echocardiogram 10/2022.  COPD / Emphysema  Pulmonary embolism, history recurrent  Deep venous thrombosis, history  Factor V Leiden deficiency  Long-term chronic anticoagulation, warfarin, life   COVID infection 7/2022  Tobacco abuse, ongoing  Family history of coronary artery disease  Otherwise as per assessment below.    RECOMMENDATIONS:      Patient overall continues to do about the same.  He is not as compliant with medical recommendations and has not had his recent testing done.  He is not taking his statin therapy.    Given his carotid bruits, chest pain, prior regular CT with cardiac coronary calcium and is normal would suggest again obtaining the following: CT calcium scoring, exercise Myoview treadmill stress test, echocardiogram, carotid ultrasound.    I encouraged him to resume his Lipitor 80 mg daily as prior.  He will continue his other medications.  Will reserve adjustments until after testing.    Exercise dietary program was encouraged.  Hydration was encouraged.    [employer] portal use was encouraged.    We will plan to see back in 2 months with above testing laboratory Studies and ECG as ordered.     Patient will follow up with their primary physician for general care.    The patient knows to contact medical care earlier if need be.      ALLERGIES:     Allergies   Allergen Reactions    Penicillins Unknown and Hives     20 yrs ago had reaction        MEDICATIONS:     Current Outpatient  Medications   Medication Instructions    ketorolac (Acular) 0.5 % ophthalmic solution 1 drop, Left Eye, 4 times daily    warfarin (Coumadin) 2 mg tablet TAKE 1 TABLET BY MOUTH EVERY DAY WITH A 5 MG TABLET or as directed per INR readings    warfarin (Coumadin) 5 mg tablet TAKE 1 TABLET BY MOUTH EVERY DAY WITH A 2 MG TABLET or as directed per INR readings       ELECTROCARDIOGRAM:      None    CARDIAC TESTING:      None    LABORATORY DATA:      CBC:   Lab Results   Component Value Date    WBC 8.4 02/13/2024    RBC 4.45 (L) 02/13/2024    HGB 13.8 02/13/2024    HCT 42.8 02/13/2024     02/13/2024        CMP:    Lab Results   Component Value Date     02/13/2024    K 4.8 02/13/2024     02/13/2024    CO2 29 02/13/2024    BUN 23 02/13/2024    CREATININE 1.06 02/13/2024    GLUCOSE 86 02/13/2024    CALCIUM 9.2 02/13/2024     Lipid Profile:    Lab Results   Component Value Date    CHOL 101 07/24/2023    TRIG 63 07/24/2023    HDL 45.6 07/24/2023    LDLF 43 07/24/2023     Hepatic Function Panel:    Lab Results   Component Value Date    ALKPHOS 67 02/13/2024    ALT 13 02/13/2024    AST 18 02/13/2024    PROT 7.0 02/13/2024    BILITOT 0.5 02/13/2024    BILIDIR 0.1 02/13/2024     TSH:    Lab Results   Component Value Date    TSH 1.68 02/13/2024       PT/INR:    Lab Results   Component Value Date    PROTIME 50.5 (H) 12/14/2022    INR 4.3 (H) 12/14/2022           PROBLEM LIST:     Patient Active Problem List   Diagnosis    Atherosclerosis of arteries    CAD in native artery    Factor V Leiden carrier (CMS/HCC)    Hyperlipidemia    Lung nodules    Smokes with greater than 40 pack year history    Long term current use of anticoagulant therapy    History of pulmonary embolus (PE)    Murmur, heart    Dermatitis             Adolph Ortega MD, FACC   [facility] / [facility] /  Cardiology      Of Note:  [employer] voice recognition dictation software was utilized partially in the preparation of this note, therefore, inaccuracies in  spelling, word choice and punctuation may have occurred which were not recognized the time of signing.    Patient was seen and examined with total time of visit including chart preparation, rooming, and chart completion exceeding 40 minutes.      ----

## 2024-03-14 DIAGNOSIS — D68.51 FACTOR V LEIDEN CARRIER (MULTI): ICD-10-CM

## 2024-03-14 RX ORDER — WARFARIN SODIUM 5 MG/1
TABLET ORAL
Qty: 90 TABLET | Refills: 3 | Status: SHIPPED | OUTPATIENT
Start: 2024-03-14

## 2024-04-01 ENCOUNTER — LAB (OUTPATIENT)
Dept: LAB | Facility: LAB | Age: 74
End: 2024-04-01
Payer: MEDICARE

## 2024-04-01 DIAGNOSIS — R01.1 MURMUR, HEART: ICD-10-CM

## 2024-04-01 DIAGNOSIS — J44.9 CHRONIC OBSTRUCTIVE PULMONARY DISEASE, UNSPECIFIED COPD TYPE (MULTI): ICD-10-CM

## 2024-04-01 DIAGNOSIS — I70.90 ATHEROSCLEROSIS OF ARTERIES: ICD-10-CM

## 2024-04-01 DIAGNOSIS — E78.2 MIXED HYPERLIPIDEMIA: ICD-10-CM

## 2024-04-01 DIAGNOSIS — Z86.711 HISTORY OF PULMONARY EMBOLUS (PE): ICD-10-CM

## 2024-04-01 DIAGNOSIS — I25.10 CORONARY ARTERY CALCIFICATION SEEN ON CAT SCAN: ICD-10-CM

## 2024-04-01 DIAGNOSIS — J43.9 PULMONARY EMPHYSEMA, UNSPECIFIED EMPHYSEMA TYPE (MULTI): ICD-10-CM

## 2024-04-01 DIAGNOSIS — I25.10 CAD IN NATIVE ARTERY: ICD-10-CM

## 2024-04-01 DIAGNOSIS — R09.89 BILATERAL CAROTID BRUITS: ICD-10-CM

## 2024-04-01 DIAGNOSIS — I26.99 PULMONARY EMBOLISM, UNSPECIFIED CHRONICITY, UNSPECIFIED PULMONARY EMBOLISM TYPE, UNSPECIFIED WHETHER ACUTE COR PULMONALE PRESENT (MULTI): ICD-10-CM

## 2024-04-01 DIAGNOSIS — F17.210 SMOKES WITH GREATER THAN 40 PACK YEAR HISTORY: ICD-10-CM

## 2024-04-01 DIAGNOSIS — D68.59 HYPERCOAGULABLE STATE (MULTI): ICD-10-CM

## 2024-04-01 DIAGNOSIS — D68.51 FACTOR V LEIDEN CARRIER (MULTI): ICD-10-CM

## 2024-04-01 DIAGNOSIS — Z86.718 HISTORY OF MATERNAL DEEP VEIN THROMBOSIS (DVT): ICD-10-CM

## 2024-04-01 DIAGNOSIS — R07.9 CHEST PAIN, UNSPECIFIED TYPE: ICD-10-CM

## 2024-04-01 DIAGNOSIS — Z79.01 LONG TERM CURRENT USE OF ANTICOAGULANT THERAPY: ICD-10-CM

## 2024-04-01 DIAGNOSIS — Z87.59 HISTORY OF MATERNAL DEEP VEIN THROMBOSIS (DVT): ICD-10-CM

## 2024-04-01 LAB
ALBUMIN SERPL BCP-MCNC: 4 G/DL (ref 3.4–5)
ALP SERPL-CCNC: 63 U/L (ref 33–136)
ALT SERPL W P-5'-P-CCNC: 16 U/L (ref 10–52)
ANION GAP SERPL CALC-SCNC: 10 MMOL/L (ref 10–20)
AST SERPL W P-5'-P-CCNC: 22 U/L (ref 9–39)
BILIRUB DIRECT SERPL-MCNC: 0.1 MG/DL (ref 0–0.3)
BILIRUB SERPL-MCNC: 0.5 MG/DL (ref 0–1.2)
BUN SERPL-MCNC: 16 MG/DL (ref 6–23)
CALCIUM SERPL-MCNC: 8.9 MG/DL (ref 8.6–10.3)
CHLORIDE SERPL-SCNC: 110 MMOL/L (ref 98–107)
CHOLEST SERPL-MCNC: 105 MG/DL (ref 0–199)
CHOLESTEROL/HDL RATIO: 2.4
CO2 SERPL-SCNC: 28 MMOL/L (ref 21–32)
CREAT SERPL-MCNC: 1.03 MG/DL (ref 0.5–1.3)
EGFRCR SERPLBLD CKD-EPI 2021: 77 ML/MIN/1.73M*2
ERYTHROCYTE [DISTWIDTH] IN BLOOD BY AUTOMATED COUNT: 13.4 % (ref 11.5–14.5)
GLUCOSE SERPL-MCNC: 90 MG/DL (ref 74–99)
HCT VFR BLD AUTO: 42.7 % (ref 41–52)
HDLC SERPL-MCNC: 44.3 MG/DL
HGB BLD-MCNC: 13.6 G/DL (ref 13.5–17.5)
LDLC SERPL CALC-MCNC: 49 MG/DL
MAGNESIUM SERPL-MCNC: 2 MG/DL (ref 1.6–2.4)
MCH RBC QN AUTO: 30.7 PG (ref 26–34)
MCHC RBC AUTO-ENTMCNC: 31.9 G/DL (ref 32–36)
MCV RBC AUTO: 96 FL (ref 80–100)
NON HDL CHOLESTEROL: 61 MG/DL (ref 0–149)
NRBC BLD-RTO: 0 /100 WBCS (ref 0–0)
PLATELET # BLD AUTO: 252 X10*3/UL (ref 150–450)
POTASSIUM SERPL-SCNC: 4.6 MMOL/L (ref 3.5–5.3)
PROT SERPL-MCNC: 6.6 G/DL (ref 6.4–8.2)
RBC # BLD AUTO: 4.43 X10*6/UL (ref 4.5–5.9)
SODIUM SERPL-SCNC: 143 MMOL/L (ref 136–145)
TRIGL SERPL-MCNC: 61 MG/DL (ref 0–149)
TSH SERPL-ACNC: 1.74 MIU/L (ref 0.44–3.98)
VLDL: 12 MG/DL (ref 0–40)
WBC # BLD AUTO: 7.9 X10*3/UL (ref 4.4–11.3)

## 2024-04-01 PROCEDURE — 84443 ASSAY THYROID STIM HORMONE: CPT

## 2024-04-01 PROCEDURE — 80061 LIPID PANEL: CPT

## 2024-04-01 PROCEDURE — 85027 COMPLETE CBC AUTOMATED: CPT

## 2024-04-01 PROCEDURE — 83735 ASSAY OF MAGNESIUM: CPT

## 2024-04-01 PROCEDURE — 82248 BILIRUBIN DIRECT: CPT

## 2024-04-01 PROCEDURE — 80053 COMPREHEN METABOLIC PANEL: CPT

## 2024-04-01 PROCEDURE — 36415 COLL VENOUS BLD VENIPUNCTURE: CPT

## 2024-04-10 ENCOUNTER — HOSPITAL ENCOUNTER (OUTPATIENT)
Dept: CARDIOLOGY | Facility: HOSPITAL | Age: 74
Discharge: HOME | End: 2024-04-10
Payer: MEDICARE

## 2024-04-10 DIAGNOSIS — D68.59 HYPERCOAGULABLE STATE (MULTI): ICD-10-CM

## 2024-04-10 DIAGNOSIS — R07.9 CHEST PAIN, UNSPECIFIED TYPE: ICD-10-CM

## 2024-04-10 DIAGNOSIS — R01.1 MURMUR, HEART: ICD-10-CM

## 2024-04-10 DIAGNOSIS — E78.2 MIXED HYPERLIPIDEMIA: ICD-10-CM

## 2024-04-10 DIAGNOSIS — I25.10 CAD IN NATIVE ARTERY: ICD-10-CM

## 2024-04-10 DIAGNOSIS — D68.51 FACTOR V LEIDEN CARRIER (MULTI): ICD-10-CM

## 2024-04-10 DIAGNOSIS — F17.210 SMOKES WITH GREATER THAN 40 PACK YEAR HISTORY: ICD-10-CM

## 2024-04-10 DIAGNOSIS — I25.10 CORONARY ARTERY CALCIFICATION SEEN ON CAT SCAN: ICD-10-CM

## 2024-04-10 DIAGNOSIS — I26.99 PULMONARY EMBOLISM, UNSPECIFIED CHRONICITY, UNSPECIFIED PULMONARY EMBOLISM TYPE, UNSPECIFIED WHETHER ACUTE COR PULMONALE PRESENT (MULTI): ICD-10-CM

## 2024-04-10 DIAGNOSIS — J43.9 PULMONARY EMPHYSEMA, UNSPECIFIED EMPHYSEMA TYPE (MULTI): ICD-10-CM

## 2024-04-10 DIAGNOSIS — Z87.59 HISTORY OF MATERNAL DEEP VEIN THROMBOSIS (DVT): ICD-10-CM

## 2024-04-10 DIAGNOSIS — Z86.718 HISTORY OF MATERNAL DEEP VEIN THROMBOSIS (DVT): ICD-10-CM

## 2024-04-10 DIAGNOSIS — R09.89 BILATERAL CAROTID BRUITS: ICD-10-CM

## 2024-04-10 DIAGNOSIS — I70.90 ATHEROSCLEROSIS OF ARTERIES: ICD-10-CM

## 2024-04-10 DIAGNOSIS — Z86.711 HISTORY OF PULMONARY EMBOLUS (PE): ICD-10-CM

## 2024-04-10 DIAGNOSIS — J44.9 CHRONIC OBSTRUCTIVE PULMONARY DISEASE, UNSPECIFIED COPD TYPE (MULTI): ICD-10-CM

## 2024-04-10 DIAGNOSIS — Z79.01 LONG TERM CURRENT USE OF ANTICOAGULANT THERAPY: ICD-10-CM

## 2024-04-10 PROCEDURE — 93880 EXTRACRANIAL BILAT STUDY: CPT

## 2024-04-10 PROCEDURE — 93880 EXTRACRANIAL BILAT STUDY: CPT | Performed by: INTERNAL MEDICINE

## 2024-04-12 ENCOUNTER — HOSPITAL ENCOUNTER (OUTPATIENT)
Dept: RADIOLOGY | Facility: HOSPITAL | Age: 74
Discharge: HOME | End: 2024-04-12
Payer: MEDICARE

## 2024-04-12 ENCOUNTER — HOSPITAL ENCOUNTER (OUTPATIENT)
Dept: CARDIOLOGY | Facility: HOSPITAL | Age: 74
Discharge: HOME | End: 2024-04-12
Payer: MEDICARE

## 2024-04-12 ENCOUNTER — APPOINTMENT (OUTPATIENT)
Dept: RADIOLOGY | Facility: HOSPITAL | Age: 74
End: 2024-04-12
Payer: MEDICARE

## 2024-04-12 ENCOUNTER — HOSPITAL ENCOUNTER (OUTPATIENT)
Dept: RADIOLOGY | Facility: HOSPITAL | Age: 74
End: 2024-04-12
Payer: MEDICARE

## 2024-04-12 DIAGNOSIS — I25.10 CAD IN NATIVE ARTERY: ICD-10-CM

## 2024-04-12 DIAGNOSIS — Z86.711 HISTORY OF PULMONARY EMBOLUS (PE): ICD-10-CM

## 2024-04-12 DIAGNOSIS — J44.9 CHRONIC OBSTRUCTIVE PULMONARY DISEASE, UNSPECIFIED COPD TYPE (MULTI): ICD-10-CM

## 2024-04-12 DIAGNOSIS — I25.10 CORONARY ARTERY CALCIFICATION SEEN ON CAT SCAN: ICD-10-CM

## 2024-04-12 DIAGNOSIS — D68.59 HYPERCOAGULABLE STATE (MULTI): ICD-10-CM

## 2024-04-12 DIAGNOSIS — Z86.718 HISTORY OF MATERNAL DEEP VEIN THROMBOSIS (DVT): ICD-10-CM

## 2024-04-12 DIAGNOSIS — J43.9 PULMONARY EMPHYSEMA, UNSPECIFIED EMPHYSEMA TYPE (MULTI): ICD-10-CM

## 2024-04-12 DIAGNOSIS — I26.99 PULMONARY EMBOLISM, UNSPECIFIED CHRONICITY, UNSPECIFIED PULMONARY EMBOLISM TYPE, UNSPECIFIED WHETHER ACUTE COR PULMONALE PRESENT (MULTI): ICD-10-CM

## 2024-04-12 DIAGNOSIS — E78.2 MIXED HYPERLIPIDEMIA: ICD-10-CM

## 2024-04-12 DIAGNOSIS — R07.9 CHEST PAIN, UNSPECIFIED TYPE: ICD-10-CM

## 2024-04-12 DIAGNOSIS — Z87.59 HISTORY OF MATERNAL DEEP VEIN THROMBOSIS (DVT): ICD-10-CM

## 2024-04-12 DIAGNOSIS — D68.51 FACTOR V LEIDEN CARRIER (MULTI): ICD-10-CM

## 2024-04-12 DIAGNOSIS — R09.89 BILATERAL CAROTID BRUITS: ICD-10-CM

## 2024-04-12 DIAGNOSIS — I70.90 ATHEROSCLEROSIS OF ARTERIES: ICD-10-CM

## 2024-04-12 DIAGNOSIS — R01.1 MURMUR, HEART: ICD-10-CM

## 2024-04-12 DIAGNOSIS — F17.210 SMOKES WITH GREATER THAN 40 PACK YEAR HISTORY: ICD-10-CM

## 2024-04-12 DIAGNOSIS — Z79.01 LONG TERM CURRENT USE OF ANTICOAGULANT THERAPY: ICD-10-CM

## 2024-04-12 PROCEDURE — 75571 CT HRT W/O DYE W/CA TEST: CPT

## 2024-04-15 ENCOUNTER — HOSPITAL ENCOUNTER (OUTPATIENT)
Dept: RADIOLOGY | Facility: HOSPITAL | Age: 74
Discharge: HOME | End: 2024-04-15
Payer: MEDICARE

## 2024-04-15 ENCOUNTER — HOSPITAL ENCOUNTER (OUTPATIENT)
Dept: CARDIOLOGY | Facility: HOSPITAL | Age: 74
Discharge: HOME | End: 2024-04-15
Payer: MEDICARE

## 2024-04-15 PROCEDURE — A9502 TC99M TETROFOSMIN: HCPCS | Performed by: INTERNAL MEDICINE

## 2024-04-15 PROCEDURE — 93017 CV STRESS TEST TRACING ONLY: CPT

## 2024-04-15 PROCEDURE — 3430000001 HC RX 343 DIAGNOSTIC RADIOPHARMACEUTICALS: Performed by: INTERNAL MEDICINE

## 2024-04-15 PROCEDURE — 78452 HT MUSCLE IMAGE SPECT MULT: CPT | Performed by: STUDENT IN AN ORGANIZED HEALTH CARE EDUCATION/TRAINING PROGRAM

## 2024-04-15 PROCEDURE — 78452 HT MUSCLE IMAGE SPECT MULT: CPT

## 2024-04-15 RX ADMIN — TETROFOSMIN 34.8 MILLICURIE: 0.23 INJECTION, POWDER, LYOPHILIZED, FOR SOLUTION INTRAVENOUS at 08:50

## 2024-04-15 RX ADMIN — TETROFOSMIN 11.4 MILLICURIE: 0.23 INJECTION, POWDER, LYOPHILIZED, FOR SOLUTION INTRAVENOUS at 06:40

## 2024-04-25 ENCOUNTER — OFFICE VISIT (OUTPATIENT)
Dept: CARDIOLOGY | Facility: CLINIC | Age: 74
End: 2024-04-25
Payer: MEDICARE

## 2024-04-25 VITALS
WEIGHT: 163.6 LBS | HEART RATE: 78 BPM | HEIGHT: 69 IN | BODY MASS INDEX: 24.23 KG/M2 | DIASTOLIC BLOOD PRESSURE: 60 MMHG | SYSTOLIC BLOOD PRESSURE: 130 MMHG | OXYGEN SATURATION: 96 %

## 2024-04-25 DIAGNOSIS — E78.2 MIXED HYPERLIPIDEMIA: ICD-10-CM

## 2024-04-25 DIAGNOSIS — I25.10 CAD IN NATIVE ARTERY: ICD-10-CM

## 2024-04-25 DIAGNOSIS — R07.9 CHEST PAIN, UNSPECIFIED TYPE: ICD-10-CM

## 2024-04-25 DIAGNOSIS — Z86.718 HISTORY OF MATERNAL DEEP VEIN THROMBOSIS (DVT): ICD-10-CM

## 2024-04-25 DIAGNOSIS — Z86.711 HISTORY OF PULMONARY EMBOLUS (PE): ICD-10-CM

## 2024-04-25 DIAGNOSIS — I25.10 CORONARY ARTERY CALCIFICATION SEEN ON CAT SCAN: ICD-10-CM

## 2024-04-25 DIAGNOSIS — R01.1 MURMUR, HEART: ICD-10-CM

## 2024-04-25 DIAGNOSIS — Z79.01 LONG TERM CURRENT USE OF ANTICOAGULANT THERAPY: ICD-10-CM

## 2024-04-25 DIAGNOSIS — D68.51 FACTOR V LEIDEN CARRIER (MULTI): ICD-10-CM

## 2024-04-25 DIAGNOSIS — F17.210 SMOKES WITH GREATER THAN 40 PACK YEAR HISTORY: ICD-10-CM

## 2024-04-25 DIAGNOSIS — Z87.59 HISTORY OF MATERNAL DEEP VEIN THROMBOSIS (DVT): ICD-10-CM

## 2024-04-25 DIAGNOSIS — J43.9 PULMONARY EMPHYSEMA, UNSPECIFIED EMPHYSEMA TYPE (MULTI): ICD-10-CM

## 2024-04-25 DIAGNOSIS — D68.59 HYPERCOAGULABLE STATE (MULTI): ICD-10-CM

## 2024-04-25 DIAGNOSIS — J44.9 CHRONIC OBSTRUCTIVE PULMONARY DISEASE, UNSPECIFIED COPD TYPE (MULTI): ICD-10-CM

## 2024-04-25 DIAGNOSIS — I26.99 PULMONARY EMBOLISM, UNSPECIFIED CHRONICITY, UNSPECIFIED PULMONARY EMBOLISM TYPE, UNSPECIFIED WHETHER ACUTE COR PULMONALE PRESENT (MULTI): ICD-10-CM

## 2024-04-25 DIAGNOSIS — R09.89 BILATERAL CAROTID BRUITS: ICD-10-CM

## 2024-04-25 DIAGNOSIS — I70.90 ATHEROSCLEROSIS OF ARTERIES: ICD-10-CM

## 2024-04-25 PROCEDURE — 1159F MED LIST DOCD IN RCRD: CPT | Performed by: INTERNAL MEDICINE

## 2024-04-25 PROCEDURE — 1126F AMNT PAIN NOTED NONE PRSNT: CPT | Performed by: INTERNAL MEDICINE

## 2024-04-25 PROCEDURE — 1160F RVW MEDS BY RX/DR IN RCRD: CPT | Performed by: INTERNAL MEDICINE

## 2024-04-25 PROCEDURE — 99214 OFFICE O/P EST MOD 30 MIN: CPT | Performed by: INTERNAL MEDICINE

## 2024-04-25 RX ORDER — METOPROLOL SUCCINATE 25 MG/1
25 TABLET, EXTENDED RELEASE ORAL DAILY
Qty: 90 TABLET | Refills: 3 | Status: SHIPPED | OUTPATIENT
Start: 2024-04-25 | End: 2025-04-25

## 2024-04-25 RX ORDER — ATORVASTATIN CALCIUM 80 MG/1
80 TABLET, FILM COATED ORAL DAILY
Qty: 90 TABLET | Refills: 3 | Status: SHIPPED | OUTPATIENT
Start: 2024-04-25 | End: 2025-04-25

## 2024-04-25 RX ORDER — ASPIRIN 81 MG/1
81 TABLET ORAL DAILY
Qty: 90 TABLET | Refills: 3 | Status: SHIPPED | OUTPATIENT
Start: 2024-04-25 | End: 2025-04-25

## 2024-04-25 RX ORDER — NITROGLYCERIN 0.4 MG/1
0.4 TABLET SUBLINGUAL EVERY 5 MIN PRN
Qty: 100 TABLET | Refills: 11 | Status: SHIPPED | OUTPATIENT
Start: 2024-04-25 | End: 2025-04-25

## 2024-04-25 ASSESSMENT — PAIN SCALES - GENERAL: PAINLEVEL: 0-NO PAIN

## 2024-06-27 ENCOUNTER — OFFICE VISIT (OUTPATIENT)
Dept: PRIMARY CARE | Facility: CLINIC | Age: 74
End: 2024-06-27
Payer: MEDICARE

## 2024-06-27 VITALS
SYSTOLIC BLOOD PRESSURE: 137 MMHG | BODY MASS INDEX: 23.78 KG/M2 | RESPIRATION RATE: 18 BRPM | TEMPERATURE: 98.3 F | DIASTOLIC BLOOD PRESSURE: 79 MMHG | OXYGEN SATURATION: 97 % | WEIGHT: 161 LBS | HEART RATE: 66 BPM

## 2024-06-27 DIAGNOSIS — H61.23 BILATERAL IMPACTED CERUMEN: Primary | ICD-10-CM

## 2024-06-27 PROCEDURE — 69210 REMOVE IMPACTED EAR WAX UNI: CPT | Performed by: FAMILY MEDICINE

## 2024-06-27 PROCEDURE — 99212 OFFICE O/P EST SF 10 MIN: CPT | Performed by: FAMILY MEDICINE

## 2024-06-27 PROCEDURE — 1159F MED LIST DOCD IN RCRD: CPT | Performed by: FAMILY MEDICINE

## 2024-06-27 NOTE — PROGRESS NOTES
Subjective   Patient ID: Jimmy Aldana is a 73 y.o. male who presents for Earache.  HPI    Bilateral ear discomfort and muffled hearing for a few days.  No drainage or fevers.      Review of Systems    Objective   Physical Exam  Constitutional:       Appearance: Normal appearance.   HENT:      Head: Normocephalic and atraumatic.      Right Ear: There is impacted cerumen.      Left Ear: There is impacted cerumen.   Cardiovascular:      Rate and Rhythm: Normal rate and regular rhythm.   Pulmonary:      Effort: Pulmonary effort is normal.      Breath sounds: Normal breath sounds.   Neurological:      Mental Status: He is alert.     Patient ID: Jimmy Aldana is a 73 y.o. male.    Ear Cerumen Removal    Date/Time: 6/27/2024 9:09 PM    Performed by: Cleopatra Siddiqui MD  Authorized by: Cleopatra Siddiqui MD    Consent:     Consent obtained:  Verbal    Consent given by:  Patient    Risks, benefits, and alternatives were discussed: yes      Risks discussed:  Bleeding, infection, pain, TM perforation and incomplete removal    Alternatives discussed:  No treatment and referral  Universal protocol:     Patient identity confirmed:  Verbally with patient  Procedure details:     Location:  L ear and R ear    Procedure type: curette      Procedure outcomes: cerumen removed    Post-procedure details:     Inspection:  Ear canal clear    Hearing quality:  Improved    Procedure completion:  Tolerated      Assessment/Plan   Problem List Items Addressed This Visit    None  Visit Diagnoses       Bilateral impacted cerumen    -  Primary

## 2024-09-09 ENCOUNTER — OFFICE VISIT (OUTPATIENT)
Dept: PRIMARY CARE | Facility: CLINIC | Age: 74
End: 2024-09-09
Payer: MEDICARE

## 2024-09-09 VITALS
OXYGEN SATURATION: 96 % | BODY MASS INDEX: 22.75 KG/M2 | DIASTOLIC BLOOD PRESSURE: 71 MMHG | HEIGHT: 69 IN | HEART RATE: 62 BPM | TEMPERATURE: 98.1 F | SYSTOLIC BLOOD PRESSURE: 118 MMHG | RESPIRATION RATE: 20 BRPM | WEIGHT: 153.6 LBS

## 2024-09-09 DIAGNOSIS — R53.81 MALAISE: ICD-10-CM

## 2024-09-09 DIAGNOSIS — R09.81 NASAL CONGESTION: ICD-10-CM

## 2024-09-09 DIAGNOSIS — F17.200 SMOKER: ICD-10-CM

## 2024-09-09 DIAGNOSIS — R05.1 ACUTE COUGH: ICD-10-CM

## 2024-09-09 DIAGNOSIS — U07.1 COVID: Primary | ICD-10-CM

## 2024-09-09 PROCEDURE — G2211 COMPLEX E/M VISIT ADD ON: HCPCS | Performed by: NURSE PRACTITIONER

## 2024-09-09 PROCEDURE — 3008F BODY MASS INDEX DOCD: CPT | Performed by: NURSE PRACTITIONER

## 2024-09-09 PROCEDURE — 99214 OFFICE O/P EST MOD 30 MIN: CPT | Performed by: NURSE PRACTITIONER

## 2024-09-09 PROCEDURE — 1159F MED LIST DOCD IN RCRD: CPT | Performed by: NURSE PRACTITIONER

## 2024-09-09 NOTE — PROGRESS NOTES
"Subjective   Patient ID: Jimmy Aldana is a 73 y.o. male who presents for COVID.    HPI   Patient in office with complaints of cough, nasal congestion and malaise x 4 days. He tested positive at home for Covid 4 days ago. Smoker. The patient declines anti-viral medication treatment (Molnupiravir or Paxlovid; Paxlovid is contraindicated d/t medication interaction). No other symptoms or concerns today.     Review of Systems  Constitutional:  Negative for chills, diaphoresis, fatigue and fever.        Positive for malaise   HENT:  Positive for congestion. Negative for ear discharge, ear pain, facial swelling, nosebleeds, postnasal drip, rhinorrhea, sinus pressure, sinus pain, sneezing, sore throat, tinnitus, trouble swallowing and voice change.    Eyes:  Negative for photophobia, pain, discharge, redness, itching and visual disturbance.   Respiratory:  Positive for cough. Negative for apnea, choking, chest tightness, shortness of breath, wheezing and stridor.    Cardiovascular:  Negative for chest pain, palpitations and leg swelling.   Gastrointestinal:  Negative for abdominal pain, diarrhea, nausea and vomiting.   Genitourinary: Negative.    Skin:  Negative for rash, pallor.   Neurological:  Negative for dizziness, headaches, tremors, seizures, syncope, facial asymmetry, speech difficulty, weakness, light-headedness and numbness.   Hematological:  Negative for adenopathy. Does not bruise/bleed easily.   Objective   /71   Pulse 62   Temp 36.7 °C (98.1 °F)   Resp 20   Ht 1.753 m (5' 9\")   Wt 69.7 kg (153 lb 9.6 oz)   SpO2 96%   BMI 22.68 kg/m²     Physical Exam  Constitutional:       Appearance: Normal appearance.   HENT:      Head: Normocephalic.      Right Ear: Tympanic membrane, ear canal and external ear normal.      Left Ear: Tympanic membrane, ear canal and external ear normal.      Nose: Congestion present. No rhinorrhea.      Mouth/Throat:      Mouth: Mucous membranes are moist.      Pharynx: " Oropharynx is clear. No oropharyngeal exudate or posterior oropharyngeal erythema.   Eyes:      Conjunctiva/sclera: Conjunctivae normal.   Cardiovascular:      Rate and Rhythm: Normal rate and regular rhythm.      Pulses: Normal pulses.      Heart sounds: Normal heart sounds.   Pulmonary:      Effort: Pulmonary effort is normal.      Breath sounds: Normal breath sounds.   Musculoskeletal:      Cervical back: Neck supple. No rigidity or tenderness.   Lymphadenopathy:      Cervical: No cervical adenopathy.   Skin:     General: Skin is warm.      Coloration: Skin is not jaundiced or pale.   Neurological:      General: No focal deficit present.      Mental Status: He is alert and oriented to person, place, and time.      Cranial Nerves: No cranial nerve deficit.      Sensory: No sensory deficit.      Motor: No weakness.      Coordination: Coordination normal.      Gait: Gait normal.      Assessment/Plan     Exam findings reviewed with patient. Medications discussed with patient. The patient may use Tylenol for fever and pain control, OTC Flonase for nasal congestion, and Mucinex for cough. Advocated rest, proper hydration, smoking cessation, and standard COVID precautions. Emergent signs and symptoms reviewed: uncontrolled fever, shortness of breath or chest pain, SaO2 level <95% on room air, uncontrolled vomiting or diarrhea. The patient verbalized understanding. The patient knows where to get emergent help. Follow up in 1 week or earlier if no improvement.

## 2024-09-09 NOTE — LETTER
9/9/2024    To whom it may concern:    Mr. Jimmy Aldana is currently under my medical care. Please excuse him from work. Tentative return to work is 9/14/2024.    Sincerely,     STALIN Norton

## 2024-09-24 ENCOUNTER — TELEPHONE (OUTPATIENT)
Dept: PRIMARY CARE | Facility: CLINIC | Age: 74
End: 2024-09-24
Payer: MEDICARE

## 2024-09-24 DIAGNOSIS — F17.210 CIGARETTE SMOKER: Primary | ICD-10-CM

## 2024-10-02 ENCOUNTER — LAB (OUTPATIENT)
Dept: LAB | Facility: LAB | Age: 74
End: 2024-10-02
Payer: MEDICARE

## 2024-10-02 DIAGNOSIS — I26.99 PULMONARY EMBOLISM, UNSPECIFIED CHRONICITY, UNSPECIFIED PULMONARY EMBOLISM TYPE, UNSPECIFIED WHETHER ACUTE COR PULMONALE PRESENT (MULTI): ICD-10-CM

## 2024-10-02 DIAGNOSIS — Z86.711 HISTORY OF PULMONARY EMBOLUS (PE): ICD-10-CM

## 2024-10-02 DIAGNOSIS — D68.51 FACTOR V LEIDEN CARRIER (MULTI): ICD-10-CM

## 2024-10-02 DIAGNOSIS — Z86.718 HISTORY OF MATERNAL DEEP VEIN THROMBOSIS (DVT): ICD-10-CM

## 2024-10-02 DIAGNOSIS — R07.9 CHEST PAIN, UNSPECIFIED TYPE: ICD-10-CM

## 2024-10-02 DIAGNOSIS — R09.89 BILATERAL CAROTID BRUITS: ICD-10-CM

## 2024-10-02 DIAGNOSIS — I25.10 CORONARY ARTERY CALCIFICATION SEEN ON CAT SCAN: ICD-10-CM

## 2024-10-02 DIAGNOSIS — J43.9 PULMONARY EMPHYSEMA, UNSPECIFIED EMPHYSEMA TYPE (MULTI): ICD-10-CM

## 2024-10-02 DIAGNOSIS — I25.10 CAD IN NATIVE ARTERY: ICD-10-CM

## 2024-10-02 DIAGNOSIS — I70.90 ATHEROSCLEROSIS OF ARTERIES: ICD-10-CM

## 2024-10-02 DIAGNOSIS — F17.210 SMOKES WITH GREATER THAN 40 PACK YEAR HISTORY: ICD-10-CM

## 2024-10-02 DIAGNOSIS — J44.9 CHRONIC OBSTRUCTIVE PULMONARY DISEASE, UNSPECIFIED COPD TYPE (MULTI): ICD-10-CM

## 2024-10-02 DIAGNOSIS — Z87.59 HISTORY OF MATERNAL DEEP VEIN THROMBOSIS (DVT): ICD-10-CM

## 2024-10-02 DIAGNOSIS — R01.1 MURMUR, HEART: ICD-10-CM

## 2024-10-02 DIAGNOSIS — D68.59 HYPERCOAGULABLE STATE (MULTI): ICD-10-CM

## 2024-10-02 DIAGNOSIS — E78.2 MIXED HYPERLIPIDEMIA: ICD-10-CM

## 2024-10-02 DIAGNOSIS — Z79.01 LONG TERM CURRENT USE OF ANTICOAGULANT THERAPY: ICD-10-CM

## 2024-10-02 LAB
ALBUMIN SERPL BCP-MCNC: 4 G/DL (ref 3.4–5)
ALP SERPL-CCNC: 55 U/L (ref 33–136)
ALT SERPL W P-5'-P-CCNC: 14 U/L (ref 10–52)
ANION GAP SERPL CALC-SCNC: 8 MMOL/L (ref 10–20)
AST SERPL W P-5'-P-CCNC: 26 U/L (ref 9–39)
BILIRUB DIRECT SERPL-MCNC: 0.1 MG/DL (ref 0–0.3)
BILIRUB SERPL-MCNC: 0.6 MG/DL (ref 0–1.2)
BUN SERPL-MCNC: 19 MG/DL (ref 6–23)
CALCIUM SERPL-MCNC: 8.6 MG/DL (ref 8.6–10.3)
CHLORIDE SERPL-SCNC: 103 MMOL/L (ref 98–107)
CHOLEST SERPL-MCNC: 95 MG/DL (ref 0–199)
CHOLESTEROL/HDL RATIO: 2.4
CO2 SERPL-SCNC: 29 MMOL/L (ref 21–32)
CREAT SERPL-MCNC: 1.14 MG/DL (ref 0.5–1.3)
CRP SERPL HS-MCNC: 0.6 MG/L
EGFRCR SERPLBLD CKD-EPI 2021: 68 ML/MIN/1.73M*2
ERYTHROCYTE [DISTWIDTH] IN BLOOD BY AUTOMATED COUNT: 13.9 % (ref 11.5–14.5)
GLUCOSE SERPL-MCNC: 93 MG/DL (ref 74–99)
HCT VFR BLD AUTO: 36.8 % (ref 41–52)
HDLC SERPL-MCNC: 40.3 MG/DL
HGB BLD-MCNC: 11.8 G/DL (ref 13.5–17.5)
LDLC SERPL CALC-MCNC: 41 MG/DL
MAGNESIUM SERPL-MCNC: 1.88 MG/DL (ref 1.6–2.4)
MCH RBC QN AUTO: 30.7 PG (ref 26–34)
MCHC RBC AUTO-ENTMCNC: 32.1 G/DL (ref 32–36)
MCV RBC AUTO: 96 FL (ref 80–100)
NON HDL CHOLESTEROL: 55 MG/DL (ref 0–149)
NRBC BLD-RTO: 0 /100 WBCS (ref 0–0)
PLATELET # BLD AUTO: 235 X10*3/UL (ref 150–450)
POTASSIUM SERPL-SCNC: 4.4 MMOL/L (ref 3.5–5.3)
PROT SERPL-MCNC: 6.6 G/DL (ref 6.4–8.2)
RBC # BLD AUTO: 3.84 X10*6/UL (ref 4.5–5.9)
SODIUM SERPL-SCNC: 136 MMOL/L (ref 136–145)
TRIGL SERPL-MCNC: 67 MG/DL (ref 0–149)
TSH SERPL-ACNC: 2.66 MIU/L (ref 0.44–3.98)
VLDL: 13 MG/DL (ref 0–40)
WBC # BLD AUTO: 7.6 X10*3/UL (ref 4.4–11.3)

## 2024-10-02 PROCEDURE — 36415 COLL VENOUS BLD VENIPUNCTURE: CPT

## 2024-10-07 ENCOUNTER — APPOINTMENT (OUTPATIENT)
Dept: PRIMARY CARE | Facility: CLINIC | Age: 74
End: 2024-10-07
Payer: MEDICARE

## 2024-10-24 ENCOUNTER — APPOINTMENT (OUTPATIENT)
Dept: CARDIOLOGY | Facility: CLINIC | Age: 74
End: 2024-10-24
Payer: MEDICARE

## 2024-11-07 ENCOUNTER — APPOINTMENT (OUTPATIENT)
Dept: CARDIOLOGY | Facility: CLINIC | Age: 74
End: 2024-11-07
Payer: MEDICARE

## 2024-11-07 VITALS
SYSTOLIC BLOOD PRESSURE: 118 MMHG | BODY MASS INDEX: 22.96 KG/M2 | HEIGHT: 69 IN | WEIGHT: 155 LBS | HEART RATE: 69 BPM | OXYGEN SATURATION: 98 % | DIASTOLIC BLOOD PRESSURE: 64 MMHG

## 2024-11-07 DIAGNOSIS — I26.99 PULMONARY EMBOLISM, UNSPECIFIED CHRONICITY, UNSPECIFIED PULMONARY EMBOLISM TYPE, UNSPECIFIED WHETHER ACUTE COR PULMONALE PRESENT (MULTI): ICD-10-CM

## 2024-11-07 DIAGNOSIS — I25.10 CAD IN NATIVE ARTERY: ICD-10-CM

## 2024-11-07 DIAGNOSIS — E78.2 MIXED HYPERLIPIDEMIA: ICD-10-CM

## 2024-11-07 DIAGNOSIS — D68.51 FACTOR V LEIDEN CARRIER (MULTI): ICD-10-CM

## 2024-11-07 DIAGNOSIS — J43.9 PULMONARY EMPHYSEMA, UNSPECIFIED EMPHYSEMA TYPE (MULTI): ICD-10-CM

## 2024-11-07 DIAGNOSIS — D68.59 HYPERCOAGULABLE STATE (MULTI): ICD-10-CM

## 2024-11-07 DIAGNOSIS — I25.10 CORONARY ARTERY CALCIFICATION SEEN ON CAT SCAN: ICD-10-CM

## 2024-11-07 DIAGNOSIS — R09.89 BILATERAL CAROTID BRUITS: ICD-10-CM

## 2024-11-07 DIAGNOSIS — R07.9 CHEST PAIN, UNSPECIFIED TYPE: ICD-10-CM

## 2024-11-07 DIAGNOSIS — Z79.01 LONG TERM CURRENT USE OF ANTICOAGULANT THERAPY: ICD-10-CM

## 2024-11-07 DIAGNOSIS — Z86.718 HISTORY OF MATERNAL DEEP VEIN THROMBOSIS (DVT): ICD-10-CM

## 2024-11-07 DIAGNOSIS — Z87.59 HISTORY OF MATERNAL DEEP VEIN THROMBOSIS (DVT): ICD-10-CM

## 2024-11-07 DIAGNOSIS — F17.210 SMOKES WITH GREATER THAN 40 PACK YEAR HISTORY: ICD-10-CM

## 2024-11-07 DIAGNOSIS — I70.90 ATHEROSCLEROSIS OF ARTERIES: ICD-10-CM

## 2024-11-07 DIAGNOSIS — Z86.711 HISTORY OF PULMONARY EMBOLUS (PE): ICD-10-CM

## 2024-11-07 DIAGNOSIS — R01.1 MURMUR, HEART: ICD-10-CM

## 2024-11-07 DIAGNOSIS — J44.9 CHRONIC OBSTRUCTIVE PULMONARY DISEASE, UNSPECIFIED COPD TYPE (MULTI): ICD-10-CM

## 2024-11-07 PROCEDURE — 99214 OFFICE O/P EST MOD 30 MIN: CPT | Performed by: INTERNAL MEDICINE

## 2024-11-07 PROCEDURE — 1159F MED LIST DOCD IN RCRD: CPT | Performed by: INTERNAL MEDICINE

## 2024-11-07 PROCEDURE — 3008F BODY MASS INDEX DOCD: CPT | Performed by: INTERNAL MEDICINE

## 2024-11-07 RX ORDER — TRIAMCINOLONE ACETONIDE 1 MG/G
CREAM TOPICAL
COMMUNITY
Start: 2024-04-24

## 2024-11-07 RX ORDER — ATORVASTATIN CALCIUM 80 MG/1
80 TABLET, FILM COATED ORAL DAILY
Qty: 90 TABLET | Refills: 3 | Status: SHIPPED | OUTPATIENT
Start: 2024-11-07 | End: 2025-11-07

## 2024-11-07 RX ORDER — METOPROLOL SUCCINATE 25 MG/1
25 TABLET, EXTENDED RELEASE ORAL DAILY
Qty: 90 TABLET | Refills: 3 | Status: SHIPPED | OUTPATIENT
Start: 2024-11-07 | End: 2025-11-07

## 2024-11-07 RX ORDER — ASPIRIN 81 MG/1
81 TABLET ORAL DAILY
Qty: 90 TABLET | Refills: 3 | Status: SHIPPED | OUTPATIENT
Start: 2024-11-07 | End: 2024-11-07 | Stop reason: SDUPTHER

## 2024-11-07 RX ORDER — NITROGLYCERIN 0.4 MG/1
0.4 TABLET SUBLINGUAL EVERY 5 MIN PRN
Qty: 100 TABLET | Refills: 11 | Status: SHIPPED | OUTPATIENT
Start: 2024-11-07 | End: 2025-11-07

## 2024-11-07 RX ORDER — ASPIRIN 81 MG/1
81 TABLET ORAL DAILY
Qty: 90 TABLET | Refills: 3 | Status: SHIPPED | OUTPATIENT
Start: 2024-11-07 | End: 2025-11-07

## 2024-11-07 NOTE — PROGRESS NOTES
CARDIOLOGY OFFICE NOTE     Date:   11/7/2024    Patient:    Jimmy Aldana    YOB: 1950    Primary Physician: Cleopatra Siddiqui MD       Reason for Visit: Cardiology follow-up visit.     HPI:     Jimmy Aldana was seen in cardiac evaluation at the  Cardiology office November 7, 2024.      The patients problems are listed as in the impression below.    Electronic medical records reviewed.    Patient returns.  74-year-old gentleman with positive CT calcium score with negative stress test.  He admits to having had COVID July 2024.  Since he has been having some chest pain left side.  It is not exertionally related.    He has no other cardiovascular complaints.    Patient denies SOB, Lightheadedness, Dizziness, TIA or CVA symptoms.  No CHF or Edema.  No Palpitations.  No GI,  or Bleeding Issues. No Recent Fever or Chills.     Cardiovascular and general review of systems is otherwise negative.    A 14-system review is otherwise negative, other than noted.     PHYSICAL EXAMINATION:      Vitals:    11/07/24 1051   BP: 118/64   Pulse: 69   SpO2: 98%     General: No acute distress. Vital signs as noted. Alert and oriented.  Head And Neck Examination: No jugular venous distention, no carotid bruits, no mass. Carotid upstrokes preserved. Oral mucosa moist.  No xanthelasma. Head and neck examination otherwise unremarkable.  Lungs: Clear to auscultation and percussion. No wheezes, no rales,  and no rhonchi.  Chest: Excursion appeared to be normal. No chest wall tenderness on palpation.  Heart: Normal S1 and S2. No S3. No S4. No rub. Grade 1/6 systolic murmur, best heard at the left sternal border. Point of maximal impulse was within normal limits.  Abdomen: Soft. Nontender. No organomegaly. No bruits. No masses. Obese.  Extremities: No bipedal edema. No clubbing. No cyanosis.  Pulses are strong throughout. No bruits.  Musculoskeletal Exam: No ulcers, otherwise unremarkable.  Neuro: Neurologically appeared  grossly intact.  .  IMPRESSION:       Chest pain symptoms,   Coronary artery disease with severe coronary calcifications by CT, 9/2023  Elevated CT calcium score, 788, 4/2024  Negative exercise Myoview perfusion stress test, 4/2024.  Hyperlipidemia   Carotid bruits, right 50 to 69% ICA, left less than 50%, ultrasound 4/2024.  Systolic ejection murmur  Prior submaximal stress echocardiogram 10/2022.  COPD / Emphysema  Pulmonary embolism, history recurrent  Deep venous thrombosis, history  Factor V Leiden deficiency  Long-term chronic anticoagulation, warfarin, life   COVID infection 7/2024  Tobacco abuse, ongoing  Family history of coronary artery disease  Otherwise as per assessment below.    RECOMMENDATIONS:      The patient has above-noted history and findings.  Overall he feels well.  He does have some chest pain which is atypical and not cardiac sounding.  Appears to be doing well overall.  Would suggest continuing his current medications with the exception of decreasing aspirin to 3 times a week.  Refills were provided.    Exercise dietary program.  Hydration.    [employer] portal use was encouraged.    We will plan to see back in 6 months with carotid ultrasound, echocardiogram, laboratory Studies and ECG as ordered.     Patient will follow up with their primary physician for general care.    The patient knows to contact medical care earlier if need be.      ALLERGIES:     Allergies   Allergen Reactions    Penicillins Unknown and Hives     20 yrs ago had reaction        MEDICATIONS:     Current Outpatient Medications   Medication Instructions    aspirin 81 mg, oral, Daily    atorvastatin (LIPITOR) 80 mg, oral, Daily    metoprolol succinate XL (TOPROL-XL) 25 mg, oral, Daily, Do not crush or chew.    nitroglycerin (NITROSTAT) 0.4 mg, sublingual, Every 5 min PRN, May repeat dose every 5 minutes for up to 3 doses total.    triamcinolone (Kenalog) 0.1 % cream PLEASE SEE ATTACHED FOR DETAILED DIRECTIONS    warfarin  (Coumadin) 2 mg tablet TAKE 1 TABLET BY MOUTH EVERY DAY WITH A 5 MG TABLET or as directed per INR readings    warfarin (Coumadin) 5 mg tablet TAKE 1 TABLET BY MOUTH EVERY DAY WITH A 2 MG TABLET or as directed per INR readings       ELECTROCARDIOGRAM:      None this visit    CARDIAC TESTING:      None this visit    LABORATORY DATA:      Reviewed with patient.    CBC:   Lab Results   Component Value Date    WBC 7.6 10/02/2024    RBC 3.84 (L) 10/02/2024    HGB 11.8 (L) 10/02/2024    HCT 36.8 (L) 10/02/2024     10/02/2024        CMP:    Lab Results   Component Value Date     10/02/2024    K 4.4 10/02/2024     10/02/2024    CO2 29 10/02/2024    BUN 19 10/02/2024    CREATININE 1.14 10/02/2024    GLUCOSE 93 10/02/2024    CALCIUM 8.6 10/02/2024       Magnesium:    Lab Results   Component Value Date    MG 1.88 10/02/2024       Lipid Profile:    Lab Results   Component Value Date    CHOL 95 10/02/2024    TRIG 67 10/02/2024    HDL 40.3 10/02/2024    LDLF 43 07/24/2023   10/2024: LDL 41.    Hepatic Function Panel:    Lab Results   Component Value Date    ALKPHOS 55 10/02/2024    ALT 14 10/02/2024    AST 26 10/02/2024    PROT 6.6 10/02/2024    BILITOT 0.6 10/02/2024    BILIDIR 0.1 10/02/2024       TSH:    Lab Results   Component Value Date    TSH 2.66 10/02/2024                 PROBLEM LIST:     Patient Active Problem List   Diagnosis    Atherosclerosis of arteries    CAD in native artery    Factor V Leiden carrier (Multi)    Hyperlipidemia    Lung nodules    Smokes with greater than 40 pack year history    Long term current use of anticoagulant therapy    History of pulmonary embolus (PE)    Murmur, heart    Dermatitis             Adolph Ortega MD, [facility]   [facility] / [facility] /  Cardiology      Of Note:  [employer] voice recognition dictation software was utilized partially in the preparation of this note, therefore, inaccuracies in spelling, word choice and punctuation may have occurred which were not  recognized the time of signing.    Patient was seen and examined with total time of visit including chart preparation, rooming, and chart completion exceeding 40 minutes.      ----

## 2024-12-06 ENCOUNTER — OFFICE VISIT (OUTPATIENT)
Dept: PRIMARY CARE | Facility: CLINIC | Age: 74
End: 2024-12-06
Payer: MEDICARE

## 2024-12-06 VITALS
DIASTOLIC BLOOD PRESSURE: 64 MMHG | WEIGHT: 157 LBS | RESPIRATION RATE: 16 BRPM | BODY MASS INDEX: 23.18 KG/M2 | OXYGEN SATURATION: 98 % | SYSTOLIC BLOOD PRESSURE: 122 MMHG | TEMPERATURE: 97.8 F | HEART RATE: 72 BPM

## 2024-12-06 DIAGNOSIS — H92.01 OTALGIA OF RIGHT EAR: Primary | ICD-10-CM

## 2024-12-06 PROCEDURE — 1159F MED LIST DOCD IN RCRD: CPT | Performed by: FAMILY MEDICINE

## 2024-12-06 PROCEDURE — 90662 IIV NO PRSV INCREASED AG IM: CPT | Performed by: FAMILY MEDICINE

## 2024-12-06 PROCEDURE — G0008 ADMIN INFLUENZA VIRUS VAC: HCPCS | Performed by: FAMILY MEDICINE

## 2024-12-06 PROCEDURE — 99213 OFFICE O/P EST LOW 20 MIN: CPT | Performed by: FAMILY MEDICINE

## 2024-12-06 PROCEDURE — G2211 COMPLEX E/M VISIT ADD ON: HCPCS | Performed by: FAMILY MEDICINE

## 2024-12-06 NOTE — PROGRESS NOTES
Subjective   Patient ID: Jimmy Aldana is a 74 y.o. male who presents for Earache (Right).  HPI    Patient states 5 days ago developed right ear pain.  He had some old eardrops at home so use them for approximately 36 hours and states that the pain was significantly improved, but he still has some tenderness when he touches his ear canal.  He denies any drainage no hearing changes        Review of Systems    Objective   Physical Exam  Constitutional:       Appearance: Normal appearance.   HENT:      Right Ear: Tympanic membrane normal.      Left Ear: Tympanic membrane and ear canal normal.      Ears:      Comments: Evidence of a ruptured pustule in right ear canal  Neurological:      Mental Status: He is alert.         Assessment & Plan  Otalgia of right ear  Recommended Tylenol if discomfort recurs

## 2024-12-12 ENCOUNTER — HOSPITAL ENCOUNTER (OUTPATIENT)
Dept: RADIOLOGY | Facility: HOSPITAL | Age: 74
Discharge: HOME | End: 2024-12-12
Payer: MEDICARE

## 2024-12-12 DIAGNOSIS — F17.210 CIGARETTE SMOKER: ICD-10-CM

## 2024-12-12 PROCEDURE — 71271 CT THORAX LUNG CANCER SCR C-: CPT

## 2025-01-26 DIAGNOSIS — D68.51 FACTOR V LEIDEN CARRIER (MULTI): ICD-10-CM

## 2025-01-27 RX ORDER — WARFARIN SODIUM 5 MG/1
TABLET ORAL
Qty: 90 TABLET | Refills: 3 | Status: SHIPPED | OUTPATIENT
Start: 2025-01-27

## 2025-01-31 ENCOUNTER — TELEPHONE (OUTPATIENT)
Dept: PRIMARY CARE | Facility: CLINIC | Age: 75
End: 2025-01-31

## 2025-01-31 NOTE — TELEPHONE ENCOUNTER
Jimmy Aldana phoned, [phone]. He would like to speak to you regarding his upcoming Dentist appointment on Thursday. Regarding starting and stopping medication.

## 2025-02-04 NOTE — TELEPHONE ENCOUNTER
Notify patient he can stop his warfarin 2 days prior to his tooth extraction, I would like him to resume it the day after the extraction unless otherwise directed by the dentist.  Please fax note as well.

## 2025-04-03 ENCOUNTER — APPOINTMENT (OUTPATIENT)
Dept: PRIMARY CARE | Facility: CLINIC | Age: 75
End: 2025-04-03
Payer: MEDICARE

## 2025-04-10 ENCOUNTER — APPOINTMENT (OUTPATIENT)
Dept: PRIMARY CARE | Facility: CLINIC | Age: 75
End: 2025-04-10
Payer: MEDICARE

## 2025-04-10 NOTE — PROGRESS NOTES
Subjective   Patient ID: Jimmy Aldana is a 74 y.o. male who presents for Memory Loss.  HPI    Patient and his wife both voice a little bit of a concern with his memory.  He states that he is has been struggling a little bit more with short-term memory for example forgetting where he placed items for forgetting what he came into a room for.  He denies forgetting any conversations he is had but does occasionally miss the details.      Mini cog score today was 3, he was able to draw the clock face without any difficulty but only recall 1 word of 3.      Struggling at times to remember first names of historical people, some other word finding difficulty.  Does not feel like he is having any difficulty understanding conversations.      He continues to be able to manage his finances and balance his checkbook .      He denies any change in his mood or personality.  Denies any hallucinations .       He denies getting lost while driving, denies any recent falls .      Also noticed today that his blood pressure was elevated.  He denies any chest pain shortness of breath lightheadedness or palpitations.       Review of Systems    Objective   Physical Exam  Constitutional:       Appearance: Normal appearance.   Cardiovascular:      Rate and Rhythm: Normal rate and regular rhythm.   Pulmonary:      Effort: Pulmonary effort is normal.      Breath sounds: Normal breath sounds.   Abdominal:      General: Abdomen is flat. Bowel sounds are normal.      Palpations: Abdomen is soft.   Skin:     General: Skin is warm and dry.   Neurological:      Mental Status: He is alert.   Psychiatric:         Mood and Affect: Mood normal.         Behavior: Behavior normal.         Assessment & Plan  Mild cognitive impairment  We will continue to monitor, reviewed brain health activity such as staying physically active socially active , encouraged patient to quit smoking, improved cardiovascular risk factors       Colon cancer  screening    Orders:    Cologuard® colon cancer screening; Future    Hypertension, unspecified type    Orders:    ramipril (Altace) 2.5 mg capsule; Take 1 capsule (2.5 mg) by mouth once daily.     Follow-up 1 month for htn  Follow up in 6 mos for MWV/cog impairment

## 2025-04-11 ENCOUNTER — APPOINTMENT (OUTPATIENT)
Dept: PRIMARY CARE | Facility: CLINIC | Age: 75
End: 2025-04-11
Payer: MEDICARE

## 2025-04-11 VITALS
HEART RATE: 67 BPM | DIASTOLIC BLOOD PRESSURE: 82 MMHG | RESPIRATION RATE: 16 BRPM | TEMPERATURE: 97.8 F | BODY MASS INDEX: 23.07 KG/M2 | WEIGHT: 156.2 LBS | OXYGEN SATURATION: 100 % | SYSTOLIC BLOOD PRESSURE: 147 MMHG

## 2025-04-11 DIAGNOSIS — G31.84 MILD COGNITIVE IMPAIRMENT: Primary | ICD-10-CM

## 2025-04-11 DIAGNOSIS — Z12.11 COLON CANCER SCREENING: ICD-10-CM

## 2025-04-11 DIAGNOSIS — I10 HYPERTENSION, UNSPECIFIED TYPE: ICD-10-CM

## 2025-04-11 PROCEDURE — 3077F SYST BP >= 140 MM HG: CPT | Performed by: FAMILY MEDICINE

## 2025-04-11 PROCEDURE — G2211 COMPLEX E/M VISIT ADD ON: HCPCS | Performed by: FAMILY MEDICINE

## 2025-04-11 PROCEDURE — 3078F DIAST BP <80 MM HG: CPT | Performed by: FAMILY MEDICINE

## 2025-04-11 PROCEDURE — 1160F RVW MEDS BY RX/DR IN RCRD: CPT | Performed by: FAMILY MEDICINE

## 2025-04-11 PROCEDURE — 1159F MED LIST DOCD IN RCRD: CPT | Performed by: FAMILY MEDICINE

## 2025-04-11 PROCEDURE — 99214 OFFICE O/P EST MOD 30 MIN: CPT | Performed by: FAMILY MEDICINE

## 2025-04-11 RX ORDER — RAMIPRIL 2.5 MG/1
2.5 CAPSULE ORAL DAILY
Qty: 30 CAPSULE | Refills: 11 | Status: SHIPPED | OUTPATIENT
Start: 2025-04-11 | End: 2026-04-11

## 2025-04-11 NOTE — ASSESSMENT & PLAN NOTE
We will continue to monitor, reviewed brain health activity such as staying physically active socially active , encouraged patient to quit smoking, improved cardiovascular risk factors

## 2025-04-28 ENCOUNTER — HOSPITAL ENCOUNTER (EMERGENCY)
Facility: HOSPITAL | Age: 75
Discharge: HOME | End: 2025-04-28
Attending: STUDENT IN AN ORGANIZED HEALTH CARE EDUCATION/TRAINING PROGRAM
Payer: MEDICARE

## 2025-04-28 ENCOUNTER — OFFICE VISIT (OUTPATIENT)
Dept: PRIMARY CARE | Facility: CLINIC | Age: 75
End: 2025-04-28
Payer: MEDICARE

## 2025-04-28 VITALS
TEMPERATURE: 98 F | WEIGHT: 153.4 LBS | BODY MASS INDEX: 22.65 KG/M2 | SYSTOLIC BLOOD PRESSURE: 128 MMHG | HEART RATE: 65 BPM | DIASTOLIC BLOOD PRESSURE: 53 MMHG

## 2025-04-28 VITALS
DIASTOLIC BLOOD PRESSURE: 70 MMHG | HEART RATE: 62 BPM | OXYGEN SATURATION: 98 % | WEIGHT: 154 LBS | SYSTOLIC BLOOD PRESSURE: 162 MMHG | RESPIRATION RATE: 18 BRPM | BODY MASS INDEX: 22.81 KG/M2 | HEIGHT: 69 IN | TEMPERATURE: 98 F

## 2025-04-28 DIAGNOSIS — H57.9 ABNORMAL VISION SCREEN: ICD-10-CM

## 2025-04-28 DIAGNOSIS — Z79.01 LONG TERM CURRENT USE OF ANTICOAGULANT THERAPY: ICD-10-CM

## 2025-04-28 DIAGNOSIS — H57.9 EYE PRESSURE: ICD-10-CM

## 2025-04-28 DIAGNOSIS — H11.31 SUBCONJUNCTIVAL HEMORRHAGE OF RIGHT EYE: Primary | ICD-10-CM

## 2025-04-28 DIAGNOSIS — E16.2 HYPOGLYCEMIA: ICD-10-CM

## 2025-04-28 LAB
ALBUMIN SERPL BCP-MCNC: 4.4 G/DL (ref 3.4–5)
ALP SERPL-CCNC: 65 U/L (ref 33–136)
ALT SERPL W P-5'-P-CCNC: 17 U/L (ref 10–52)
ANION GAP SERPL CALC-SCNC: 11 MMOL/L (ref 10–20)
AST SERPL W P-5'-P-CCNC: 24 U/L (ref 9–39)
BASOPHILS # BLD AUTO: 0.07 X10*3/UL (ref 0–0.1)
BASOPHILS NFR BLD AUTO: 0.6 %
BILIRUB SERPL-MCNC: 0.5 MG/DL (ref 0–1.2)
BUN SERPL-MCNC: 19 MG/DL (ref 6–23)
CALCIUM SERPL-MCNC: 8.8 MG/DL (ref 8.6–10.3)
CHLORIDE SERPL-SCNC: 104 MMOL/L (ref 98–107)
CO2 SERPL-SCNC: 26 MMOL/L (ref 21–32)
CREAT SERPL-MCNC: 1.13 MG/DL (ref 0.5–1.3)
EGFRCR SERPLBLD CKD-EPI 2021: 69 ML/MIN/1.73M*2
EOSINOPHIL # BLD AUTO: 0.63 X10*3/UL (ref 0–0.4)
EOSINOPHIL NFR BLD AUTO: 5.8 %
ERYTHROCYTE [DISTWIDTH] IN BLOOD BY AUTOMATED COUNT: 13.6 % (ref 11.5–14.5)
GLUCOSE BLD MANUAL STRIP-MCNC: 148 MG/DL (ref 74–99)
GLUCOSE BLD MANUAL STRIP-MCNC: 82 MG/DL (ref 74–99)
GLUCOSE SERPL-MCNC: 50 MG/DL (ref 74–99)
HCT VFR BLD AUTO: 38.6 % (ref 41–52)
HGB BLD-MCNC: 12.4 G/DL (ref 13.5–17.5)
IMM GRANULOCYTES # BLD AUTO: 0.03 X10*3/UL (ref 0–0.5)
IMM GRANULOCYTES NFR BLD AUTO: 0.3 % (ref 0–0.9)
INR PPP: 2.3 (ref 0.9–1.1)
LYMPHOCYTES # BLD AUTO: 2.98 X10*3/UL (ref 0.8–3)
LYMPHOCYTES NFR BLD AUTO: 27.6 %
MCH RBC QN AUTO: 30.6 PG (ref 26–34)
MCHC RBC AUTO-ENTMCNC: 32.1 G/DL (ref 32–36)
MCV RBC AUTO: 95 FL (ref 80–100)
MONOCYTES # BLD AUTO: 1.41 X10*3/UL (ref 0.05–0.8)
MONOCYTES NFR BLD AUTO: 13 %
NEUTROPHILS # BLD AUTO: 5.69 X10*3/UL (ref 1.6–5.5)
NEUTROPHILS NFR BLD AUTO: 52.7 %
NRBC BLD-RTO: 0 /100 WBCS (ref 0–0)
PLATELET # BLD AUTO: 259 X10*3/UL (ref 150–450)
POTASSIUM SERPL-SCNC: 3.7 MMOL/L (ref 3.5–5.3)
PROT SERPL-MCNC: 7.7 G/DL (ref 6.4–8.2)
PROTHROMBIN TIME: 25.5 SECONDS (ref 9.8–12.4)
RBC # BLD AUTO: 4.05 X10*6/UL (ref 4.5–5.9)
SODIUM SERPL-SCNC: 137 MMOL/L (ref 136–145)
WBC # BLD AUTO: 10.8 X10*3/UL (ref 4.4–11.3)

## 2025-04-28 PROCEDURE — 82947 ASSAY GLUCOSE BLOOD QUANT: CPT

## 2025-04-28 PROCEDURE — 99213 OFFICE O/P EST LOW 20 MIN: CPT | Performed by: NURSE PRACTITIONER

## 2025-04-28 PROCEDURE — 80053 COMPREHEN METABOLIC PANEL: CPT

## 2025-04-28 PROCEDURE — 3074F SYST BP LT 130 MM HG: CPT | Performed by: NURSE PRACTITIONER

## 2025-04-28 PROCEDURE — 99284 EMERGENCY DEPT VISIT MOD MDM: CPT | Performed by: STUDENT IN AN ORGANIZED HEALTH CARE EDUCATION/TRAINING PROGRAM

## 2025-04-28 PROCEDURE — 3078F DIAST BP <80 MM HG: CPT | Performed by: NURSE PRACTITIONER

## 2025-04-28 PROCEDURE — 85025 COMPLETE CBC W/AUTO DIFF WBC: CPT

## 2025-04-28 PROCEDURE — 99283 EMERGENCY DEPT VISIT LOW MDM: CPT

## 2025-04-28 PROCEDURE — 1159F MED LIST DOCD IN RCRD: CPT | Performed by: NURSE PRACTITIONER

## 2025-04-28 PROCEDURE — 85610 PROTHROMBIN TIME: CPT

## 2025-04-28 PROCEDURE — 36415 COLL VENOUS BLD VENIPUNCTURE: CPT

## 2025-04-28 RX ORDER — WATER
500 LIQUID (ML) MISCELLANEOUS ONCE
Status: COMPLETED | OUTPATIENT
Start: 2025-04-28 | End: 2025-04-28

## 2025-04-28 RX ADMIN — Medication 500 ML: at 15:18

## 2025-04-28 ASSESSMENT — ENCOUNTER SYMPTOMS
ACTIVITY CHANGE: 0
HYPERACTIVE: 0
APPETITE CHANGE: 0
FATIGUE: 0
NERVOUS/ANXIOUS: 0
COUGH: 0
TROUBLE SWALLOWING: 0
PHOTOPHOBIA: 0
CHILLS: 0
CHOKING: 0
FACIAL ASYMMETRY: 0
DIZZINESS: 0
STRIDOR: 0
NECK STIFFNESS: 0
EYE PAIN: 1
CONFUSION: 0
WEAKNESS: 0
OCCASIONAL FEELINGS OF UNSTEADINESS: 0
LOSS OF SENSATION IN FEET: 0
AGITATION: 0
HEADACHES: 0
ADENOPATHY: 0
SINUS PRESSURE: 0
NUMBNESS: 0
DYSPHORIC MOOD: 0
NECK PAIN: 0
FEVER: 0
DIAPHORESIS: 0
LIGHT-HEADEDNESS: 0
SPEECH DIFFICULTY: 0
DEPRESSION: 0
CHEST TIGHTNESS: 0
SHORTNESS OF BREATH: 0
EYE DISCHARGE: 0
UNEXPECTED WEIGHT CHANGE: 0
BRUISES/BLEEDS EASILY: 0
WHEEZING: 0
EYE ITCHING: 0
SEIZURES: 0
PALPITATIONS: 0
APNEA: 0
TREMORS: 0
SINUS PAIN: 0
EYE REDNESS: 1

## 2025-04-28 ASSESSMENT — COLUMBIA-SUICIDE SEVERITY RATING SCALE - C-SSRS
2. HAVE YOU ACTUALLY HAD ANY THOUGHTS OF KILLING YOURSELF?: NO
6. HAVE YOU EVER DONE ANYTHING, STARTED TO DO ANYTHING, OR PREPARED TO DO ANYTHING TO END YOUR LIFE?: NO
1. IN THE PAST MONTH, HAVE YOU WISHED YOU WERE DEAD OR WISHED YOU COULD GO TO SLEEP AND NOT WAKE UP?: NO

## 2025-04-28 ASSESSMENT — VISUAL ACUITY
OS: 20/40
OS: 20/10
OD: 20/13
OU: 20/40
OD: 20/63

## 2025-04-28 ASSESSMENT — LIFESTYLE VARIABLES
EVER FELT BAD OR GUILTY ABOUT YOUR DRINKING: NO
HAVE PEOPLE ANNOYED YOU BY CRITICIZING YOUR DRINKING: NO
EVER HAD A DRINK FIRST THING IN THE MORNING TO STEADY YOUR NERVES TO GET RID OF A HANGOVER: NO
TOTAL SCORE: 0
HAVE YOU EVER FELT YOU SHOULD CUT DOWN ON YOUR DRINKING: NO

## 2025-04-28 ASSESSMENT — PAIN - FUNCTIONAL ASSESSMENT: PAIN_FUNCTIONAL_ASSESSMENT: 0-10

## 2025-04-28 ASSESSMENT — PAIN SCALES - GENERAL: PAINLEVEL_OUTOF10: 0 - NO PAIN

## 2025-04-28 NOTE — PROGRESS NOTES
Subjective   Patient ID: Jimmy Aldana is a 74 y.o. male who presents for an eye problem.    HPI   Patient in office with mild right eye pressure and significant redness to right eye; started yesterday. The patient is on Coumadin for hx of PE. The patient reports mild eye pressure. He denies any vision changes or any other symptoms.     Review of Systems   Constitutional:  Negative for activity change, appetite change, chills, diaphoresis, fatigue, fever and unexpected weight change.   HENT:  Negative for congestion, hearing loss, nosebleeds, sinus pressure, sinus pain and trouble swallowing.    Eyes:  Positive for pain and redness. Negative for photophobia, discharge, itching and visual disturbance.   Respiratory:  Negative for apnea, cough, choking, chest tightness, shortness of breath, wheezing and stridor.    Cardiovascular:  Negative for chest pain, palpitations and leg swelling.   Musculoskeletal:  Negative for neck pain and neck stiffness.   Skin:  Negative for pallor.   Neurological:  Negative for dizziness, tremors, seizures, syncope, facial asymmetry, speech difficulty, weakness, light-headedness, numbness and headaches.        Hx of mild cognitive impairment    Hematological:  Negative for adenopathy. Does not bruise/bleed easily.   Psychiatric/Behavioral:  Negative for agitation, confusion, dysphoric mood and suicidal ideas. The patient is not nervous/anxious and is not hyperactive.        Objective   /53   Pulse 65   Temp 36.7 °C (98 °F)   Wt 69.6 kg (153 lb 6.4 oz)   BMI 22.65 kg/m²     Physical Exam  Constitutional:       Appearance: Normal appearance.   HENT:      Head: Normocephalic.      Right Ear: Tympanic membrane, ear canal and external ear normal.      Left Ear: Tympanic membrane, ear canal and external ear normal.      Nose: No congestion.      Mouth/Throat:      Mouth: Mucous membranes are moist.      Pharynx: Oropharynx is clear. No oropharyngeal exudate or posterior oropharyngeal  erythema.   Eyes:      General: No scleral icterus.        Left eye: No discharge.      Extraocular Movements: Extraocular movements intact.      Pupils: Pupils are equal, round, and reactive to light.      Comments: Right subconjunctival hemorrhage noted; some watery discharge from the right eye   Cardiovascular:      Rate and Rhythm: Normal rate and regular rhythm.      Pulses: Normal pulses.      Heart sounds: Murmur heard.      Comments: Murmur noted  Pulmonary:      Effort: Pulmonary effort is normal.      Breath sounds: Normal breath sounds.   Musculoskeletal:      Cervical back: Neck supple. No rigidity or tenderness.   Lymphadenopathy:      Cervical: No cervical adenopathy.   Skin:     Coloration: Skin is not jaundiced or pale.   Neurological:      General: No focal deficit present.      Mental Status: He is alert and oriented to person, place, and time.      Motor: No weakness.      Coordination: Coordination normal.      Gait: Gait normal.   Psychiatric:         Mood and Affect: Mood normal.       Vision screen:    Right eye: 20/70  Left eye: 20/25  Both eyes: 20/20    Assessment/Plan     Exam findings reviewed with patient. Patient sent to emergency room (ER) for evaluation. Patient will self-transfer. Risks explained; patient verbalized understanding. Report called to [place] [facility]; spoke with GISSEL Encinas. Follow up with PCP as indicated after ER/hospital visit.

## 2025-04-28 NOTE — DISCHARGE INSTRUCTIONS
Follow-up with your ophthalmologist in [place].  Give their office a call to make an appointment.  Return to the emergency department for changes in your vision, eye pain, difficulty moving your eyes, double vision or vision loss, or you have any questions or concerns.  Your blood sugar level was low. eat a snack in the morning or afternoon.

## 2025-04-28 NOTE — ED PROVIDER NOTES
Emergency Department Provider Note        History of Present Illness     History provided by: Patient  Limitations to History: None  External Records Reviewed with Brief Summary: None    HPI:  Jimmy Aldana is a 74 y.o. male with factor V Leiden mutation, pulmonary embolism on warfarin, coronary artery disease, hypertension who presents with a subconjunctival hemorrhage of the right eye.  A friend/coworker noticed it yesterday.  Patient himself was not aware of it until it was pointed out.  He had something similar about a month ago after he scratched his eye and it went away with some eyedrops.  He did not think much of it.  When he woke up this morning however, the hemorrhage had spread to the rest of the conjunctiva.  He felt a little bit of pressure in the right eye that has since resolved.  He saw his primary care doctor who did a visual acuity exam on him.  There was decreased vision in that right eye so he was referred to the emergency department.  Patient himself was not aware of the change in vision until it was tested.  He has been able to still read his book without any issues.  Does not have a headache in his eye pressure has resolved.  He does not have pain with movement of his eyes.  He did not scratch his eye this time and does not have any trauma to it.    Physical Exam   Triage vitals:  T 36.8 °C (98.2 °F)  HR 78  /62  RR 17  O2 98 % None (Room air)    Physical Exam  Vitals and nursing note reviewed.   Constitutional:       General: He is not in acute distress.     Appearance: He is well-developed.   HENT:      Head: Normocephalic and atraumatic.      Right Ear: External ear normal.      Left Ear: External ear normal.      Nose: Nose normal.   Eyes:      General: No scleral icterus.     Extraocular Movements: Extraocular movements intact.      Pupils: Pupils are equal, round, and reactive to light.      Comments: Subconjunctival hemorrhage of right eye.  No tenderness with extraocular  movements.  No proptosis.   Cardiovascular:      Rate and Rhythm: Normal rate and regular rhythm.      Heart sounds: No murmur heard.  Pulmonary:      Effort: Pulmonary effort is normal. No respiratory distress.      Breath sounds: Normal breath sounds.   Abdominal:      Palpations: Abdomen is soft.      Tenderness: There is no abdominal tenderness.   Musculoskeletal:         General: No swelling.      Cervical back: Neck supple. No rigidity.   Skin:     General: Skin is warm and dry.      Capillary Refill: Capillary refill takes less than 2 seconds.   Neurological:      General: No focal deficit present.      Mental Status: He is alert.   Psychiatric:         Mood and Affect: Mood normal.          Medical Decision Making & ED Course   Medical Decision Makin y.o. male presenting for right eye subsequent hemorrhage.  He does not have any proptosis.  No trauma.  Pupils are equal, round, reactive to light.  Intraocular pressure of the right eye is 15, intraocular pressure of the left eye is 14.  Patient's first visual acuity exam showed decreased vision in the right eye.  As this would not be expected for a simple subconjunctival hemorrhage, etiology Sasha is retrobulbar hematoma was considered.  We spoke with the ophthalmology team at [facility], Dr. Godfrey, regarding further workup.  However, upon further investigation, patient did have new glasses prescribed to him 2 weeks ago.  He does not consistently wear it.  If the patient's visual acuity is normal with his corrective lenses, they do believe that a retrobulbar hematoma is unlikely, and description is consistent with a subconjunctival hemorrhage.  With the patient's glasses, his vision is normal bilaterally.  However, given that he is on Coumadin, lab work is obtained to evaluate for anemia or supratherapeutic INR.    CBC is negative for leukocytosis.  Hematoma for similar to prior lab work.  His INR is therapeutic at 2.3.  His chemistry panel showed normal  electrolytes and renal function but did show hypoglycemia 58.  Patient is awake and alert, no acute process, nontoxic in appearance.  He states that he typically does not eat anything until the afternoon.  He is advised to eat at least a snack or breakfast.  He is given crackers and ginger ale.  Repeat blood glucoses are 82, 148.  Patient has an ophthalmologist from his prior corneal surgeries.  He feels comfortable following up with his ophthalmologist.  Home care and return instructions discussed. Patient expressed understanding and agreement. Patient discharged in stable condition.    Gee Nielson DO, PGY-4  Emergency Medicine Resident     Social Determinants of Health which Significantly Impact Care: None identified     EKG Independent Interpretation: EKG not obtained    Independent Result Review and Interpretation: Relevant laboratory and radiographic results were reviewed and independently interpreted by myself.  As necessary, they are commented on in the ED Course.    Chronic conditions affecting the patient's care: As documented above in [facility]    The patient was discussed with the following consultants/services:  Ophthalmology    Care Considerations: As documented above in [facility]    ED Course:  ED Course as of 04/29/25 0445   Mon Apr 28, 2025   1341   Visual Acuity  Bilateral Distance: 20/40R Distance: 20/63L Distance: 20/40     [TL]   1403 keaton [place]nn. [AL]   1405 Patient went out to his car with my guidance and bedside RN advised to obtain repeat visual acuity as he just recently had a new prescription for glasses in the last 2 weeks and his visual acuity both in the office as an outpatient and here in the emergency department was done without his glasses in place.  He does have subconjunctival hemorrhage but no sign of hyphema.  There is no obvious vitreous hemorrhage on my funduscopic nondilated assessment. [TL]      ED Course User Index  [AL] Gee Nielson DO  [TL] Kurt Loza DO         Diagnoses  as of 04/29/25 0445   Subconjunctival hemorrhage of right eye   Hypoglycemia     Disposition   As a result of the work-up, the patient was discharged home.  he was informed of his diagnosis and instructed to come back with any concerns or worsening of condition.  he and was agreeable to the plan as discussed above.  he was given the opportunity to ask questions.  All of the patient's questions were answered.    Procedures   Procedures    Patient seen and discussed with ED attending physician.    Gee Nielson DO  Emergency Medicine     Gee Nielson DO  Resident  04/29/25 0445

## 2025-05-07 ENCOUNTER — HOSPITAL ENCOUNTER (OUTPATIENT)
Dept: CARDIOLOGY | Facility: CLINIC | Age: 75
Discharge: HOME | End: 2025-05-07
Payer: MEDICARE

## 2025-05-07 VITALS
BODY MASS INDEX: 22.81 KG/M2 | SYSTOLIC BLOOD PRESSURE: 138 MMHG | DIASTOLIC BLOOD PRESSURE: 70 MMHG | WEIGHT: 154 LBS | HEIGHT: 69 IN

## 2025-05-07 DIAGNOSIS — J43.9 PULMONARY EMPHYSEMA, UNSPECIFIED EMPHYSEMA TYPE (MULTI): ICD-10-CM

## 2025-05-07 DIAGNOSIS — D68.51 FACTOR V LEIDEN CARRIER (MULTI): ICD-10-CM

## 2025-05-07 DIAGNOSIS — R09.89 BILATERAL CAROTID BRUITS: ICD-10-CM

## 2025-05-07 DIAGNOSIS — D68.59 HYPERCOAGULABLE STATE (MULTI): ICD-10-CM

## 2025-05-07 DIAGNOSIS — R01.1 MURMUR, HEART: ICD-10-CM

## 2025-05-07 DIAGNOSIS — Z79.01 LONG TERM CURRENT USE OF ANTICOAGULANT THERAPY: ICD-10-CM

## 2025-05-07 DIAGNOSIS — J44.9 CHRONIC OBSTRUCTIVE PULMONARY DISEASE, UNSPECIFIED COPD TYPE (MULTI): ICD-10-CM

## 2025-05-07 DIAGNOSIS — E78.2 MIXED HYPERLIPIDEMIA: ICD-10-CM

## 2025-05-07 DIAGNOSIS — I70.90 ATHEROSCLEROSIS OF ARTERIES: ICD-10-CM

## 2025-05-07 DIAGNOSIS — Z87.59 HISTORY OF MATERNAL DEEP VEIN THROMBOSIS (DVT): ICD-10-CM

## 2025-05-07 DIAGNOSIS — F17.210 SMOKES WITH GREATER THAN 40 PACK YEAR HISTORY: ICD-10-CM

## 2025-05-07 DIAGNOSIS — I25.10 CAD IN NATIVE ARTERY: ICD-10-CM

## 2025-05-07 DIAGNOSIS — Z86.711 HISTORY OF PULMONARY EMBOLUS (PE): ICD-10-CM

## 2025-05-07 DIAGNOSIS — I25.10 CORONARY ARTERY CALCIFICATION SEEN ON CAT SCAN: ICD-10-CM

## 2025-05-07 DIAGNOSIS — Z86.718 HISTORY OF MATERNAL DEEP VEIN THROMBOSIS (DVT): ICD-10-CM

## 2025-05-07 DIAGNOSIS — I26.99 PULMONARY EMBOLISM, UNSPECIFIED CHRONICITY, UNSPECIFIED PULMONARY EMBOLISM TYPE, UNSPECIFIED WHETHER ACUTE COR PULMONALE PRESENT (MULTI): ICD-10-CM

## 2025-05-07 DIAGNOSIS — R07.9 CHEST PAIN, UNSPECIFIED TYPE: ICD-10-CM

## 2025-05-07 LAB — BODY SURFACE AREA: 1.84 M2

## 2025-05-07 PROCEDURE — 93306 TTE W/DOPPLER COMPLETE: CPT

## 2025-05-07 PROCEDURE — 93880 EXTRACRANIAL BILAT STUDY: CPT

## 2025-05-07 PROCEDURE — 93880 EXTRACRANIAL BILAT STUDY: CPT | Performed by: INTERNAL MEDICINE

## 2025-05-07 NOTE — PROGRESS NOTES
Subjective   Patient ID: Jimmy Aldana is a 74 y.o. male who presents for Hypertension.  HPI    Patient states they are tolerating medications well with good adherence.  They deny any chest pain, shortness of breath, lightheadedness, palpitations, or lower extremity edema.        Review of Systems    Objective   Physical Exam  Constitutional:       Appearance: Normal appearance.   Cardiovascular:      Rate and Rhythm: Normal rate and regular rhythm.   Pulmonary:      Effort: Pulmonary effort is normal.      Breath sounds: Normal breath sounds.   Abdominal:      General: Abdomen is flat. Bowel sounds are normal.      Palpations: Abdomen is soft.   Skin:     General: Skin is warm and dry.   Neurological:      Mental Status: He is alert.   Psychiatric:         Mood and Affect: Mood normal.         Behavior: Behavior normal.         Assessment & Plan  Hypertension, unspecified type  Continue with ramipril and metoprolol for hypertension       Osteoarthritis of multiple joints, unspecified osteoarthritis type    Orders:    acetaminophen (Tylenol 8 HOUR) 650 mg ER tablet; Take 1 tablet (650 mg) by mouth 2 times a day. Do not crush, chew, or split.       Follow up 3 mos

## 2025-05-08 ENCOUNTER — APPOINTMENT (OUTPATIENT)
Dept: PRIMARY CARE | Facility: CLINIC | Age: 75
End: 2025-05-08
Payer: MEDICARE

## 2025-05-08 VITALS
SYSTOLIC BLOOD PRESSURE: 133 MMHG | DIASTOLIC BLOOD PRESSURE: 65 MMHG | RESPIRATION RATE: 16 BRPM | WEIGHT: 154.2 LBS | TEMPERATURE: 98.3 F | BODY MASS INDEX: 22.77 KG/M2 | OXYGEN SATURATION: 98 % | HEART RATE: 64 BPM

## 2025-05-08 DIAGNOSIS — I10 HYPERTENSION, UNSPECIFIED TYPE: Primary | ICD-10-CM

## 2025-05-08 DIAGNOSIS — J44.9 CHRONIC OBSTRUCTIVE PULMONARY DISEASE, UNSPECIFIED COPD TYPE (MULTI): ICD-10-CM

## 2025-05-08 DIAGNOSIS — M15.9 OSTEOARTHRITIS OF MULTIPLE JOINTS, UNSPECIFIED OSTEOARTHRITIS TYPE: ICD-10-CM

## 2025-05-08 LAB
ALBUMIN SERPL-MCNC: 3.7 G/DL (ref 3.6–5.1)
ALBUMIN/GLOB SERPL: 1.4 (CALC) (ref 1–2.5)
ALP SERPL-CCNC: 64 U/L (ref 35–144)
ALT SERPL-CCNC: 12 U/L (ref 9–46)
ANION GAP SERPL CALCULATED.4IONS-SCNC: 8 MMOL/L (CALC) (ref 7–17)
AST SERPL-CCNC: 23 U/L (ref 10–35)
BILIRUB DIRECT SERPL-MCNC: 0.1 MG/DL
BILIRUB INDIRECT SERPL-MCNC: 0.3 MG/DL (CALC) (ref 0.2–1.2)
BILIRUB SERPL-MCNC: 0.4 MG/DL (ref 0.2–1.2)
BUN SERPL-MCNC: 17 MG/DL (ref 7–25)
BUN/CREAT SERPL: ABNORMAL (CALC) (ref 6–22)
CALCIUM SERPL-MCNC: 8.5 MG/DL (ref 8.6–10.3)
CHLORIDE SERPL-SCNC: 107 MMOL/L (ref 98–110)
CHOLEST SERPL-MCNC: 85 MG/DL
CHOLEST/HDLC SERPL: 2.2 (CALC)
CO2 SERPL-SCNC: 26 MMOL/L (ref 20–32)
CREAT SERPL-MCNC: 1.07 MG/DL (ref 0.7–1.28)
EGFRCR SERPLBLD CKD-EPI 2021: 73 ML/MIN/1.73M2
ERYTHROCYTE [DISTWIDTH] IN BLOOD BY AUTOMATED COUNT: 12.7 % (ref 11–15)
GLOBULIN SER CALC-MCNC: 2.7 G/DL (CALC) (ref 1.9–3.7)
GLUCOSE SERPL-MCNC: 85 MG/DL (ref 65–99)
HCT VFR BLD AUTO: 38.1 % (ref 38.5–50)
HDLC SERPL-MCNC: 39 MG/DL
HGB BLD-MCNC: 12 G/DL (ref 13.2–17.1)
LDLC SERPL CALC-MCNC: 32 MG/DL (CALC)
MAGNESIUM SERPL-MCNC: 2 MG/DL (ref 1.5–2.5)
MCH RBC QN AUTO: 30.2 PG (ref 27–33)
MCHC RBC AUTO-ENTMCNC: 31.5 G/DL (ref 32–36)
MCV RBC AUTO: 95.7 FL (ref 80–100)
NONHDLC SERPL-MCNC: 46 MG/DL (CALC)
PLATELET # BLD AUTO: 241 THOUSAND/UL (ref 140–400)
PMV BLD REES-ECKER: 12.1 FL (ref 7.5–12.5)
POTASSIUM SERPL-SCNC: 4.4 MMOL/L (ref 3.5–5.3)
PROT SERPL-MCNC: 6.4 G/DL (ref 6.1–8.1)
RBC # BLD AUTO: 3.98 MILLION/UL (ref 4.2–5.8)
SODIUM SERPL-SCNC: 141 MMOL/L (ref 135–146)
TRIGL SERPL-MCNC: 68 MG/DL
TSH SERPL-ACNC: 2.83 MIU/L (ref 0.4–4.5)
WBC # BLD AUTO: 7.9 THOUSAND/UL (ref 3.8–10.8)

## 2025-05-08 PROCEDURE — G2211 COMPLEX E/M VISIT ADD ON: HCPCS | Performed by: FAMILY MEDICINE

## 2025-05-08 PROCEDURE — 1159F MED LIST DOCD IN RCRD: CPT | Performed by: FAMILY MEDICINE

## 2025-05-08 PROCEDURE — 3078F DIAST BP <80 MM HG: CPT | Performed by: FAMILY MEDICINE

## 2025-05-08 PROCEDURE — 3075F SYST BP GE 130 - 139MM HG: CPT | Performed by: FAMILY MEDICINE

## 2025-05-08 PROCEDURE — 99213 OFFICE O/P EST LOW 20 MIN: CPT | Performed by: FAMILY MEDICINE

## 2025-05-08 RX ORDER — DEXTROMETHORPHAN HYDROBROMIDE, GUAIFENESIN 5; 100 MG/5ML; MG/5ML
650 LIQUID ORAL EVERY 8 HOURS PRN
COMMUNITY
End: 2025-05-08 | Stop reason: SDUPTHER

## 2025-05-08 RX ORDER — DEXTROMETHORPHAN HYDROBROMIDE, GUAIFENESIN 5; 100 MG/5ML; MG/5ML
650 LIQUID ORAL 2 TIMES DAILY
Start: 2025-05-08

## 2025-05-08 ASSESSMENT — PATIENT HEALTH QUESTIONNAIRE - PHQ9
SUM OF ALL RESPONSES TO PHQ9 QUESTIONS 1 AND 2: 0
2. FEELING DOWN, DEPRESSED OR HOPELESS: NOT AT ALL
1. LITTLE INTEREST OR PLEASURE IN DOING THINGS: NOT AT ALL

## 2025-05-12 LAB
AORTIC VALVE MEAN GRADIENT: 14 MMHG
AORTIC VALVE PEAK VELOCITY: 2.42 M/S
AV PEAK GRADIENT: 23 MMHG
AVA (PEAK VEL): 0.74 CM2
AVA (VTI): 0.76 CM2
EJECTION FRACTION APICAL 4 CHAMBER: 49.7
EJECTION FRACTION: 60 %
LEFT ATRIUM VOLUME AREA LENGTH INDEX BSA: 26.7 ML/M2
LEFT VENTRICLE INTERNAL DIMENSION DIASTOLE: 5.29 CM (ref 3.5–6)
LEFT VENTRICULAR OUTFLOW TRACT DIAMETER: 1.7 CM
LV EJECTION FRACTION BIPLANE: 56 %
MITRAL VALVE E/A RATIO: 0.94
RIGHT VENTRICLE PEAK SYSTOLIC PRESSURE: 27.2 MMHG
TRICUSPID ANNULAR PLANE SYSTOLIC EXCURSION: 1.8 CM

## 2025-05-15 ENCOUNTER — APPOINTMENT (OUTPATIENT)
Dept: CARDIOLOGY | Facility: CLINIC | Age: 75
End: 2025-05-15
Payer: MEDICARE

## 2025-05-15 VITALS
HEART RATE: 64 BPM | OXYGEN SATURATION: 98 % | HEIGHT: 69 IN | SYSTOLIC BLOOD PRESSURE: 120 MMHG | WEIGHT: 153 LBS | BODY MASS INDEX: 22.66 KG/M2 | DIASTOLIC BLOOD PRESSURE: 68 MMHG

## 2025-05-15 DIAGNOSIS — Z86.711 HISTORY OF PULMONARY EMBOLUS (PE): ICD-10-CM

## 2025-05-15 DIAGNOSIS — I25.10 CAD IN NATIVE ARTERY: ICD-10-CM

## 2025-05-15 DIAGNOSIS — D68.59 HYPERCOAGULABLE STATE (MULTI): ICD-10-CM

## 2025-05-15 DIAGNOSIS — Z79.01 LONG TERM CURRENT USE OF ANTICOAGULANT THERAPY: ICD-10-CM

## 2025-05-15 DIAGNOSIS — D64.9 ANEMIA, UNSPECIFIED TYPE: ICD-10-CM

## 2025-05-15 DIAGNOSIS — E78.2 MIXED HYPERLIPIDEMIA: ICD-10-CM

## 2025-05-15 DIAGNOSIS — R01.1 MURMUR, HEART: ICD-10-CM

## 2025-05-15 DIAGNOSIS — I35.0 NONRHEUMATIC AORTIC (VALVE) STENOSIS: Primary | ICD-10-CM

## 2025-05-15 DIAGNOSIS — F17.210 SMOKES WITH GREATER THAN 40 PACK YEAR HISTORY: ICD-10-CM

## 2025-05-15 DIAGNOSIS — I70.90 ATHEROSCLEROSIS OF ARTERIES: ICD-10-CM

## 2025-05-15 DIAGNOSIS — J43.9 PULMONARY EMPHYSEMA, UNSPECIFIED EMPHYSEMA TYPE (MULTI): ICD-10-CM

## 2025-05-15 DIAGNOSIS — Z87.59 HISTORY OF MATERNAL DEEP VEIN THROMBOSIS (DVT): ICD-10-CM

## 2025-05-15 DIAGNOSIS — J44.9 CHRONIC OBSTRUCTIVE PULMONARY DISEASE, UNSPECIFIED COPD TYPE (MULTI): ICD-10-CM

## 2025-05-15 DIAGNOSIS — R09.89 BILATERAL CAROTID BRUITS: ICD-10-CM

## 2025-05-15 DIAGNOSIS — I35.0 NONRHEUMATIC AORTIC VALVE STENOSIS: ICD-10-CM

## 2025-05-15 DIAGNOSIS — D68.51 FACTOR V LEIDEN CARRIER (MULTI): ICD-10-CM

## 2025-05-15 DIAGNOSIS — Z86.718 HISTORY OF MATERNAL DEEP VEIN THROMBOSIS (DVT): ICD-10-CM

## 2025-05-15 DIAGNOSIS — I25.10 CORONARY ARTERY CALCIFICATION SEEN ON CAT SCAN: ICD-10-CM

## 2025-05-15 DIAGNOSIS — I26.99 PULMONARY EMBOLISM, UNSPECIFIED CHRONICITY, UNSPECIFIED PULMONARY EMBOLISM TYPE, UNSPECIFIED WHETHER ACUTE COR PULMONALE PRESENT (MULTI): ICD-10-CM

## 2025-05-15 DIAGNOSIS — R07.9 CHEST PAIN, UNSPECIFIED TYPE: ICD-10-CM

## 2025-05-15 NOTE — PROGRESS NOTES
CARDIOLOGY OFFICE NOTE     Date:   5/15/2025    Patient:    Jimmy Aldana    YOB: 1950    Primary Physician: Cleopatra Siddiqui MD         REASON FOR VISIT / CHIEF COMPLAINT:     Patient returns for post testing results.    HPI:     Jimmy Aldana was seen in cardiac evaluation at the [facility] Cardiology office May 15, 2025.      The patients problems are listed as in the impression below.    Electronic medical records reviewed.    Patient returns.  Overall he feels well.  He has no significant cardiovascular symptoms.  He did have a left eye vision change and went to the emergency room and saw ophthalmology.  He has no recurrence currently.    He does have some nocturnal muscle aches in his arms.    He admits to not drinking as much fluid.    Testing was as noted below with echocardiogram noting normal function with mild aortic stenosis only.    Carotid arteries noted right ICA 50 to 69% with left ICA less than 50%.    He currently is asymptomatic.    Patient denies Chest Pain, SOB, Lightheadedness, Dizziness, TIA or CVA symptoms.  No CHF or Edema.  No Palpitations.  No GI,  or Bleeding Issues. No Recent Fever or Chills.     Cardiovascular and general review of systems is otherwise negative.    A 14-system review is otherwise negative, other than noted.     PHYSICAL EXAMINATION:      Vitals:    05/15/25 0936   BP: 120/68   Pulse: 64   SpO2: 98%     General: No acute distress. Vital signs as noted. Alert and oriented.  Head And Neck Examination: No jugular venous distention, no carotid bruits, no mass. Carotid upstrokes preserved. Oral mucosa moist.  No xanthelasma. Head and neck examination otherwise unremarkable.  Lungs: Clear to auscultation and percussion. No wheezes, no rales,  and no rhonchi.  Chest: Excursion appeared to be normal. No chest wall tenderness on palpation.  Heart: Normal S1 and S2. No S3. No S4. No rub. Grade 1/6 systolic murmur, best heard at the left sternal border. Point  of maximal impulse was within normal limits.  Abdomen: Soft. Nontender. No organomegaly. No bruits. No masses. Obese.  Extremities: No bipedal edema. No clubbing. No cyanosis.  Pulses are strong throughout. No bruits.  Musculoskeletal Exam: No ulcers, otherwise unremarkable.  Neuro: Neurologically appeared grossly intact.  .  IMPRESSION:       Cardiovascular status stable  Asymptomatic  Chest pain symptoms, resolved  Coronary artery disease with severe coronary calcifications by CT, 9/2023  Elevated CT calcium score, 788, 4/2024  Systolic ejection murmur  Prior submaximal stress echocardiogram 10/2022.  Negative exercise Myoview perfusion stress test, 4/2024.  Normal LV systolic function, LVEF 60%, Echocardiogram, 5/2025.  Aortic valve stenosis, mild, V-max 2.2, peak mean gradients 23 and 13 respectively, echocardiogram, 5/2025.  Mitral regurgitation, mild  Tricuspid regurgitation, mild  Hyperlipidemia   Carotid bruits, right 50 to 69% ICA, left less than 50%, ultrasound 4/2024.  COPD / Emphysema  Pulmonary embolism, history recurrent  Deep venous thrombosis, history  Factor V Leiden deficiency  Long-term chronic anticoagulation, warfarin, life   COVID infection 7/2024  Tobacco abuse, ongoing  Family history of coronary artery disease  Otherwise as per assessment below.    RECOMMENDATIONS:      Patient was reassured of the above findings.  He does have some mild aortic valve stenosis.  His LV function is normal.  His carotid arteries are with moderate right ICA.  Would suggest continuing his current medications.  Refills were provided.    In terms of his nocturnal cramping mid arm discomfort is most likely due to dehydration would suggest over-the-counter magnesium oxide supplements and increase hydration to 64 ounces per day.    Exercise dietary program was encouraged.    [employer] portal use was encouraged.    We will plan to see back in 6 months with Laboratory Studies and ECG as ordered.     Patient will follow up  with their primary physician for general care.    The patient knows to contact medical care earlier if need be.      ALLERGIES:     Penicillins     MEDICATIONS:     Current Outpatient Medications   Medication Instructions    acetaminophen (TYLENOL 8 HOUR) 650 mg, oral, 2 times daily, Do not crush, chew, or split.    atorvastatin (LIPITOR) 80 mg, oral, Daily    metoprolol succinate XL (TOPROL-XL) 25 mg, oral, Daily, Do not crush or chew.    nitroglycerin (NITROSTAT) 0.4 mg, sublingual, Every 5 min PRN, May repeat dose every 5 minutes for up to 3 doses total.    ramipril (ALTACE) 2.5 mg, oral, Daily    triamcinolone (Kenalog) 0.1 % cream PLEASE SEE ATTACHED FOR DETAILED DIRECTIONS    warfarin (Coumadin) 2 mg tablet TAKE 1 TABLET BY MOUTH EVERY DAY WITH A 5 MG TABLET or as directed per INR readings    warfarin (Coumadin) 5 mg tablet TAKE 1 TABLET BY MOUTH EVERY DAY WITH A 2 MG TABLET OR AS DIRECTED PER INR READINGS       ELECTROCARDIOGRAM:      Sinus rhythm, rate 68.    CARDIAC TESTING:      Echocardiogram, 5/2025:  Normal LV function.  LVEF 60%.  Aortic valve stenosis, mild, V-max 2.2 m/s, peak mean gradients 23 and 13 respectively.  Mitral regurgitation, 2+  Tricuspid regurgitation, 1-2+    Carotid ultrasound, 5/2025:  Right ICA 50 to 69%  Left ICA less than 50%    LABORATORY DATA:      CBC:   Lab Results   Component Value Date    WBC 7.9 05/07/2025    RBC 3.98 (L) 05/07/2025    HGB 12.0 (L) 05/07/2025    HCT 38.1 (L) 05/07/2025     05/07/2025        CMP:    Lab Results   Component Value Date     05/07/2025    K 4.4 05/07/2025     05/07/2025    CO2 26 05/07/2025    BUN 17 05/07/2025    CREATININE 1.07 05/07/2025    GLUCOSE 85 05/07/2025    CALCIUM 8.5 (L) 05/07/2025       Magnesium:    Lab Results   Component Value Date    MG 2.0 05/07/2025       Lipid Profile:    Lab Results   Component Value Date    CHOL 85 05/07/2025    TRIG 68 05/07/2025    HDL 39 (L) 05/07/2025    LDLCALC 32 05/07/2025        Hepatic Function Panel:    Lab Results   Component Value Date    ALKPHOS 64 05/07/2025    ALT 12 05/07/2025    AST 23 05/07/2025    PROT 6.4 05/07/2025    BILITOT 0.4 05/07/2025    BILIDIR 0.1 05/07/2025       TSH:    Lab Results   Component Value Date    TSH 2.83 05/07/2025       PT/INR:    Lab Results   Component Value Date    PROTIME 25.5 (H) 04/28/2025    INR 2.3 (H) 04/28/2025                 PROBLEM LIST:     Problem List[1]          Adolph Ortega MD, [facility] /  Cardiology      Of Note:  [employer] voice recognition dictation software was utilized partially in the preparation of this note, therefore, inaccuracies in spelling, word choice and punctuation may have occurred which were not recognized at the time of signing.    Patient was seen and examined with total time of visit including chart preparation, rooming, and chart completion exceeding 40 minutes.              [1]   Patient Active Problem List  Diagnosis    Atherosclerosis of arteries    CAD in native artery    Chronic obstructive pulmonary disease, unspecified COPD type (Multi)    Factor V Leiden carrier (Multi)    Hyperlipidemia    Lung nodules    Smokes with greater than 40 pack year history    Long term current use of anticoagulant therapy    History of pulmonary embolus (PE)    Murmur, heart    Dermatitis    Mild cognitive impairment    Hypertension    Subconjunctival hemorrhage of right eye

## 2025-05-15 NOTE — PATIENT INSTRUCTIONS
Exercise diet weight loss program.    Hydrate    Use My Chart portal for reviewing records, testing and contacting office.     Try over-the-counter magnesium oxide supplements daily.    Hydration 64 ounces.    Limit coffee use.

## 2025-05-16 ENCOUNTER — APPOINTMENT (OUTPATIENT)
Dept: PRIMARY CARE | Facility: CLINIC | Age: 75
End: 2025-05-16
Payer: MEDICARE

## 2025-08-11 ENCOUNTER — APPOINTMENT (OUTPATIENT)
Dept: PRIMARY CARE | Facility: CLINIC | Age: 75
End: 2025-08-11
Payer: MEDICARE

## 2025-08-18 ENCOUNTER — TELEPHONE (OUTPATIENT)
Dept: PRIMARY CARE | Facility: CLINIC | Age: 75
End: 2025-08-18
Payer: MEDICARE

## 2025-09-16 ENCOUNTER — APPOINTMENT (OUTPATIENT)
Dept: PRIMARY CARE | Facility: CLINIC | Age: 75
End: 2025-09-16
Payer: MEDICARE

## 2025-11-18 ENCOUNTER — APPOINTMENT (OUTPATIENT)
Dept: CARDIOLOGY | Facility: CLINIC | Age: 75
End: 2025-11-18
Payer: MEDICARE

## 2025-11-25 ENCOUNTER — APPOINTMENT (OUTPATIENT)
Dept: PRIMARY CARE | Facility: CLINIC | Age: 75
End: 2025-11-25
Payer: MEDICARE